# Patient Record
Sex: FEMALE | Race: BLACK OR AFRICAN AMERICAN | NOT HISPANIC OR LATINO | Employment: FULL TIME | URBAN - METROPOLITAN AREA
[De-identification: names, ages, dates, MRNs, and addresses within clinical notes are randomized per-mention and may not be internally consistent; named-entity substitution may affect disease eponyms.]

---

## 2018-05-29 ENCOUNTER — OFFICE VISIT (OUTPATIENT)
Dept: PREADMISSION TESTING | Facility: HOSPITAL | Age: 33
End: 2018-05-29
Attending: OBSTETRICS & GYNECOLOGY
Payer: COMMERCIAL

## 2018-05-29 VITALS
SYSTOLIC BLOOD PRESSURE: 112 MMHG | BODY MASS INDEX: 34.32 KG/M2 | HEIGHT: 65 IN | WEIGHT: 206 LBS | TEMPERATURE: 97.9 F | RESPIRATION RATE: 18 BRPM | DIASTOLIC BLOOD PRESSURE: 70 MMHG | HEART RATE: 72 BPM

## 2018-05-29 DIAGNOSIS — Z01.818 PREOP TESTING: Primary | ICD-10-CM

## 2018-05-29 PROBLEM — Z34.90 TERM PREGNANCY: Status: ACTIVE | Noted: 2018-05-29

## 2018-05-29 PROBLEM — Z98.891 HISTORY OF CESAREAN SECTION: Status: ACTIVE | Noted: 2018-05-29

## 2018-05-29 LAB
ABO + RH BLD: NORMAL
BLD GP AB SCN SERPL QL: NEGATIVE
D AG BLD QL: POSITIVE
ERYTHROCYTE [DISTWIDTH] IN BLOOD BY AUTOMATED COUNT: 14.1 % (ref 11.7–14.4)
HCT VFR BLDCO AUTO: 30.1 % (ref 35–45)
HGB BLD-MCNC: 10 G/DL (ref 11.8–15.7)
LABORATORY COMMENT REPORT: NORMAL
MCH RBC QN AUTO: 28.3 PG (ref 28–33.2)
MCHC RBC AUTO-ENTMCNC: 33.2 G/DL (ref 32.2–35.5)
MCV RBC AUTO: 85.3 FL (ref 83–98)
PDW BLD AUTO: 11 FL (ref 9.4–12.3)
PLATELET # BLD AUTO: 195 K/UL (ref 150–369)
RBC # BLD AUTO: 3.53 M/UL (ref 3.93–5.22)
WBC # BLD AUTO: 7.47 K/UL (ref 3.8–10.5)

## 2018-05-29 PROCEDURE — 36415 COLL VENOUS BLD VENIPUNCTURE: CPT

## 2018-05-29 PROCEDURE — 86850 RBC ANTIBODY SCREEN: CPT

## 2018-05-29 PROCEDURE — 85027 COMPLETE CBC AUTOMATED: CPT

## 2018-05-29 RX ORDER — FERROUS SULFATE 325(65) MG
65 TABLET ORAL 2 TIMES DAILY WITH MEALS
COMMUNITY

## 2018-05-29 ASSESSMENT — ENCOUNTER SYMPTOMS
CHILLS: 0
SHORTNESS OF BREATH: 0
ROS GI COMMENTS: HEARTBURN WITH PREGNANCY
ALLERGIC/IMMUNOLOGIC NEGATIVE: 1
VOMITING: 0
PALPITATIONS: 0
MUSCULOSKELETAL NEGATIVE: 1
ABDOMINAL PAIN: 0
APNEA: 0
NUMBNESS: 0
DYSURIA: 0
NAUSEA: 1
ENDOCRINE NEGATIVE: 1
WEAKNESS: 0
FEVER: 0
WOUND: 0
HEMATURIA: 0
CHEST TIGHTNESS: 0

## 2018-05-29 ASSESSMENT — PAIN SCALES - GENERAL: PAINLEVEL: 0-NO PAIN

## 2018-05-29 NOTE — H&P
Chief complaint: term pregnancy  HPI: 33 yo female  Ab1 with term pregnancy, estimated due date for 18, denies complications with current pregnancy, she had a urgent   after failed induction  Allergies:   Allergies   Allergen Reactions   • No Known Allergies      Patient's Meds:   Current Outpatient Prescriptions:   •  ferrous sulfate 325 mg (65 mg iron) tablet, Take 65 mg by mouth 2 (two) times a day with meals., Disp: , Rfl:   •  prenatal vits96/iron fum/folic (PRE- VITAMIN) 27 mg iron- 800 mcg tablet, Take 1 tablet by mouth daily., Disp: , Rfl:   Medical History:   Past Medical History:   Diagnosis Date   • Anemia    • GERD (gastroesophageal reflux disease)     during pregnancy   • Wears glasses      Surgical History:   Past Surgical History:   Procedure Laterality Date   •  SECTION       Social History:   Social History     Social History   • Marital status:      Spouse name: N/A   • Number of children: N/A   • Years of education: N/A     Social History Main Topics   • Smoking status: Never Smoker   • Smokeless tobacco: Never Used   • Alcohol use No   • Drug use: No   • Sexual activity: Not Asked     Other Topics Concern   • None     Social History Narrative   • None     Family History:   Family History   Problem Relation Age of Onset   • No Known Problems Mother    • Diabetes Father    • Hyperlipidemia Father      Review of Systems   Constitutional: Negative for chills and fever.   HENT: Negative for dental problem and hearing loss.    Eyes: Negative for visual disturbance.        Glasses   Respiratory: Negative for apnea, chest tightness and shortness of breath.    Cardiovascular: Negative for chest pain, palpitations and leg swelling.   Gastrointestinal: Positive for nausea (1st and 2nd trimester- improved). Negative for abdominal pain and vomiting.        Heartburn with pregnancy   Endocrine: Negative.    Genitourinary: Negative for dysuria and hematuria.    Musculoskeletal: Negative.    Skin: Negative for wound.   Allergic/Immunologic: Negative.    Neurological: Negative for weakness and numbness.   Hematological:        Anemia     Vitals:   Vitals:    18 0834   BP: 112/70   Pulse: 72   Resp: 18   Temp: 36.6 °C (97.9 °F)     Body mass index is 34.28 kg/m².  Physical Exam   Constitutional: She is oriented to person, place, and time. She appears well-developed and well-nourished.   HENT:   Head: Normocephalic.   Mouth/Throat: Oropharynx is clear and moist.   Eyes: Conjunctivae are normal. Pupils are equal, round, and reactive to light.   Neck: Normal range of motion. Neck supple. No thyromegaly present.   Cardiovascular: Normal rate, regular rhythm, normal heart sounds and intact distal pulses.    No murmur heard.  Pulmonary/Chest: Effort normal and breath sounds normal. No respiratory distress.   Abdominal: Soft. Bowel sounds are normal. She exhibits no mass. There is no tenderness.   + fetal heart sounds 136 BPM   Genitourinary:   Genitourinary Comments: Deferred to OB GYN   Musculoskeletal: Normal range of motion.   Lymphadenopathy:     She has no cervical adenopathy.   Neurological: She is alert and oriented to person, place, and time.   Skin: Skin is warm and dry.   Psychiatric: She has a normal mood and affect. Her behavior is normal. Thought content normal.   Vitals reviewed.    Labs pending  Patient Active Problem List   Diagnosis   • Term pregnancy   • History of  section       Assessment/Plan:  Repeat

## 2018-06-15 ENCOUNTER — ANESTHESIA EVENT (INPATIENT)
Dept: OBSTETRICS AND GYNECOLOGY | Facility: HOSPITAL | Age: 33
End: 2018-06-15
Payer: COMMERCIAL

## 2018-06-15 ENCOUNTER — ANESTHESIA (INPATIENT)
Dept: OBSTETRICS AND GYNECOLOGY | Facility: HOSPITAL | Age: 33
End: 2018-06-15
Payer: COMMERCIAL

## 2018-06-15 ENCOUNTER — HOSPITAL ENCOUNTER (INPATIENT)
Facility: HOSPITAL | Age: 33
LOS: 3 days | Discharge: HOME | End: 2018-06-18
Attending: OBSTETRICS & GYNECOLOGY | Admitting: OBSTETRICS & GYNECOLOGY
Payer: COMMERCIAL

## 2018-06-15 DIAGNOSIS — Z98.891 HISTORY OF CESAREAN SECTION: ICD-10-CM

## 2018-06-15 DIAGNOSIS — Z34.90 TERM PREGNANCY: Primary | ICD-10-CM

## 2018-06-15 LAB
ABO + RH BLD: NORMAL
BASOPHILS # BLD: 0.02 K/UL (ref 0.01–0.1)
BASOPHILS NFR BLD: 0.3 %
BLD GP AB SCN SERPL QL: NEGATIVE
D AG BLD QL: POSITIVE
DIFFERENTIAL METHOD BLD: ABNORMAL
EOSINOPHIL # BLD: 0.01 K/UL (ref 0.04–0.36)
EOSINOPHIL NFR BLD: 0.2 %
ERYTHROCYTE [DISTWIDTH] IN BLOOD BY AUTOMATED COUNT: 14.5 % (ref 11.7–14.4)
HCT VFR BLDCO AUTO: 29.7 % (ref 35–45)
HGB BLD-MCNC: 9.7 G/DL (ref 11.8–15.7)
IMM GRANULOCYTES # BLD AUTO: 0.07 K/UL (ref 0–0.08)
IMM GRANULOCYTES NFR BLD AUTO: 1.1 %
LABORATORY COMMENT REPORT: NORMAL
LYMPHOCYTES # BLD: 1.49 K/UL (ref 1.2–3.5)
LYMPHOCYTES NFR BLD: 22.6 %
MCH RBC QN AUTO: 27.7 PG (ref 28–33.2)
MCHC RBC AUTO-ENTMCNC: 32.7 G/DL (ref 32.2–35.5)
MCV RBC AUTO: 84.9 FL (ref 83–98)
MONOCYTES # BLD: 0.58 K/UL (ref 0.28–0.8)
MONOCYTES NFR BLD: 8.8 %
NEUTROPHILS # BLD: 4.41 K/UL (ref 1.7–7)
NEUTS SEG NFR BLD: 67 %
NRBC BLD-RTO: 0 %
PDW BLD AUTO: 11.4 FL (ref 9.4–12.3)
PLATELET # BLD AUTO: 179 K/UL (ref 150–369)
RBC # BLD AUTO: 3.5 M/UL (ref 3.93–5.22)
WBC # BLD AUTO: 6.58 K/UL (ref 3.8–10.5)

## 2018-06-15 PROCEDURE — 37000010 ANESTHESIA SPINAL BLOCK: Performed by: ANESTHESIOLOGY

## 2018-06-15 PROCEDURE — 71000011 HC PACU PHASE 1 EA ADDL MIN: Performed by: OBSTETRICS & GYNECOLOGY

## 2018-06-15 PROCEDURE — 63600000 HC DRUGS/DETAIL CODE: Performed by: OBSTETRICS & GYNECOLOGY

## 2018-06-15 PROCEDURE — 25000000 HC PHARMACY GENERAL: Performed by: NURSE ANESTHETIST, CERTIFIED REGISTERED

## 2018-06-15 PROCEDURE — 63700000 HC SELF-ADMINISTRABLE DRUG: Performed by: OBSTETRICS & GYNECOLOGY

## 2018-06-15 PROCEDURE — 12000000 HC ROOM AND CARE MED/SURG

## 2018-06-15 PROCEDURE — 63600000 HC DRUGS/DETAIL CODE: Performed by: ANESTHESIOLOGY

## 2018-06-15 PROCEDURE — 86900 BLOOD TYPING SEROLOGIC ABO: CPT

## 2018-06-15 PROCEDURE — 63600000 HC DRUGS/DETAIL CODE: Performed by: NURSE ANESTHETIST, CERTIFIED REGISTERED

## 2018-06-15 PROCEDURE — 86592 SYPHILIS TEST NON-TREP QUAL: CPT | Performed by: OBSTETRICS & GYNECOLOGY

## 2018-06-15 PROCEDURE — 85025 COMPLETE CBC W/AUTO DIFF WBC: CPT | Performed by: OBSTETRICS & GYNECOLOGY

## 2018-06-15 PROCEDURE — 71000001 HC PACU PHASE 1 INITIAL 30MIN: Performed by: OBSTETRICS & GYNECOLOGY

## 2018-06-15 PROCEDURE — 37000010 HC ANESTHESIA SPINAL: Performed by: OBSTETRICS & GYNECOLOGY

## 2018-06-15 PROCEDURE — 72000021 HC C SECTION LEVEL 1: Performed by: OBSTETRICS & GYNECOLOGY

## 2018-06-15 PROCEDURE — 36415 COLL VENOUS BLD VENIPUNCTURE: CPT | Performed by: OBSTETRICS & GYNECOLOGY

## 2018-06-15 RX ORDER — PHENYLEPHRINE HYDROCHLORIDE 10 MG/ML
INJECTION INTRAVENOUS AS NEEDED
Status: DISCONTINUED | OUTPATIENT
Start: 2018-06-15 | End: 2018-06-15 | Stop reason: SURG

## 2018-06-15 RX ORDER — OXYCODONE AND ACETAMINOPHEN 5; 325 MG/1; MG/1
1-2 TABLET ORAL EVERY 4 HOURS PRN
Status: DISCONTINUED | OUTPATIENT
Start: 2018-06-15 | End: 2018-06-18 | Stop reason: HOSPADM

## 2018-06-15 RX ORDER — SODIUM CHLORIDE, SODIUM LACTATE, POTASSIUM CHLORIDE, CALCIUM CHLORIDE 600; 310; 30; 20 MG/100ML; MG/100ML; MG/100ML; MG/100ML
INJECTION, SOLUTION INTRAVENOUS CONTINUOUS
Status: DISCONTINUED | OUTPATIENT
Start: 2018-06-15 | End: 2018-06-18 | Stop reason: HOSPADM

## 2018-06-15 RX ORDER — DIPHENHYDRAMINE HYDROCHLORIDE 50 MG/ML
12.5 INJECTION INTRAMUSCULAR; INTRAVENOUS EVERY 6 HOURS PRN
Status: DISCONTINUED | OUTPATIENT
Start: 2018-06-15 | End: 2018-06-18 | Stop reason: HOSPADM

## 2018-06-15 RX ORDER — MORPHINE SULFATE 0.5 MG/ML
INJECTION, SOLUTION EPIDURAL; INTRATHECAL; INTRAVENOUS AS NEEDED
Status: DISCONTINUED | OUTPATIENT
Start: 2018-06-15 | End: 2018-06-15 | Stop reason: SURG

## 2018-06-15 RX ORDER — FERROUS SULFATE 325(65) MG
325 TABLET ORAL 2 TIMES DAILY WITH MEALS
Status: DISCONTINUED | OUTPATIENT
Start: 2018-06-15 | End: 2018-06-18 | Stop reason: HOSPADM

## 2018-06-15 RX ORDER — DIPHENHYDRAMINE HCL 25 MG
25 CAPSULE ORAL EVERY 6 HOURS PRN
Status: DISCONTINUED | OUTPATIENT
Start: 2018-06-15 | End: 2018-06-18 | Stop reason: HOSPADM

## 2018-06-15 RX ORDER — OXYTOCIN/RINGER'S LACTATE 20/1000 ML
125 PLASTIC BAG, INJECTION (ML) INTRAVENOUS CONTINUOUS
Status: ACTIVE | OUTPATIENT
Start: 2018-06-15 | End: 2018-06-15

## 2018-06-15 RX ORDER — AMOXICILLIN 250 MG
1 CAPSULE ORAL 2 TIMES DAILY
Status: DISCONTINUED | OUTPATIENT
Start: 2018-06-15 | End: 2018-06-18 | Stop reason: HOSPADM

## 2018-06-15 RX ORDER — LANOLIN
1 WAX (GRAM) MISCELLANEOUS AS NEEDED
Status: DISCONTINUED | OUTPATIENT
Start: 2018-06-15 | End: 2018-06-18 | Stop reason: HOSPADM

## 2018-06-15 RX ORDER — IBUPROFEN 600 MG/1
600 TABLET ORAL EVERY 6 HOURS PRN
Status: DISCONTINUED | OUTPATIENT
Start: 2018-06-17 | End: 2018-06-18 | Stop reason: HOSPADM

## 2018-06-15 RX ORDER — KETOROLAC TROMETHAMINE 30 MG/ML
INJECTION, SOLUTION INTRAMUSCULAR; INTRAVENOUS AS NEEDED
Status: DISCONTINUED | OUTPATIENT
Start: 2018-06-15 | End: 2018-06-15 | Stop reason: SURG

## 2018-06-15 RX ORDER — ONDANSETRON HYDROCHLORIDE 2 MG/ML
INJECTION, SOLUTION INTRAVENOUS AS NEEDED
Status: DISCONTINUED | OUTPATIENT
Start: 2018-06-15 | End: 2018-06-15 | Stop reason: SURG

## 2018-06-15 RX ORDER — NALOXONE HYDROCHLORIDE 0.4 MG/ML
0.4 INJECTION, SOLUTION INTRAMUSCULAR; INTRAVENOUS; SUBCUTANEOUS AS NEEDED
Status: DISCONTINUED | OUTPATIENT
Start: 2018-06-15 | End: 2018-06-18 | Stop reason: HOSPADM

## 2018-06-15 RX ORDER — DIBUCAINE 1 %
1 OINTMENT (GRAM) TOPICAL AS NEEDED
Status: DISCONTINUED | OUTPATIENT
Start: 2018-06-15 | End: 2018-06-18 | Stop reason: HOSPADM

## 2018-06-15 RX ORDER — PROCHLORPERAZINE EDISYLATE 5 MG/ML
10 INJECTION INTRAMUSCULAR; INTRAVENOUS EVERY 6 HOURS PRN
Status: DISCONTINUED | OUTPATIENT
Start: 2018-06-15 | End: 2018-06-18 | Stop reason: HOSPADM

## 2018-06-15 RX ORDER — ONDANSETRON HYDROCHLORIDE 2 MG/ML
4 INJECTION, SOLUTION INTRAVENOUS EVERY 6 HOURS PRN
Status: DISCONTINUED | OUTPATIENT
Start: 2018-09-13 | End: 2018-06-18 | Stop reason: HOSPADM

## 2018-06-15 RX ORDER — OXYTOCIN/RINGER'S LACTATE 20/1000 ML
PLASTIC BAG, INJECTION (ML) INTRAVENOUS AS NEEDED
Status: DISCONTINUED | OUTPATIENT
Start: 2018-06-15 | End: 2018-06-15 | Stop reason: SURG

## 2018-06-15 RX ORDER — HYDROMORPHONE HYDROCHLORIDE 1 MG/ML
0.5 INJECTION, SOLUTION INTRAMUSCULAR; INTRAVENOUS; SUBCUTANEOUS
Status: DISCONTINUED | OUTPATIENT
Start: 2018-06-15 | End: 2018-06-18 | Stop reason: HOSPADM

## 2018-06-15 RX ORDER — KETOROLAC TROMETHAMINE 15 MG/ML
15 INJECTION, SOLUTION INTRAMUSCULAR; INTRAVENOUS
Status: DISPENSED | OUTPATIENT
Start: 2018-06-15 | End: 2018-06-17

## 2018-06-15 RX ORDER — SODIUM CHLORIDE, SODIUM LACTATE, POTASSIUM CHLORIDE, CALCIUM CHLORIDE 600; 310; 30; 20 MG/100ML; MG/100ML; MG/100ML; MG/100ML
125 INJECTION, SOLUTION INTRAVENOUS CONTINUOUS
Status: ACTIVE | OUTPATIENT
Start: 2018-06-15 | End: 2018-06-16

## 2018-06-15 RX ORDER — ONDANSETRON 4 MG/1
4 TABLET, ORALLY DISINTEGRATING ORAL EVERY 6 HOURS PRN
Status: DISCONTINUED | OUTPATIENT
Start: 2018-06-15 | End: 2018-06-18 | Stop reason: HOSPADM

## 2018-06-15 RX ORDER — FENTANYL CITRATE 50 UG/ML
INJECTION, SOLUTION INTRAMUSCULAR; INTRAVENOUS AS NEEDED
Status: DISCONTINUED | OUTPATIENT
Start: 2018-06-15 | End: 2018-06-15 | Stop reason: SURG

## 2018-06-15 RX ORDER — ONDANSETRON HYDROCHLORIDE 2 MG/ML
4 INJECTION, SOLUTION INTRAVENOUS
Status: DISCONTINUED | OUTPATIENT
Start: 2018-06-15 | End: 2018-06-18 | Stop reason: HOSPADM

## 2018-06-15 RX ORDER — EPHEDRINE SULFATE 50 MG/ML
INJECTION, SOLUTION INTRAVENOUS AS NEEDED
Status: DISCONTINUED | OUTPATIENT
Start: 2018-06-15 | End: 2018-06-15 | Stop reason: SURG

## 2018-06-15 RX ORDER — FENTANYL CITRATE 50 UG/ML
50 INJECTION, SOLUTION INTRAMUSCULAR; INTRAVENOUS
Status: DISCONTINUED | OUTPATIENT
Start: 2018-06-15 | End: 2018-06-18 | Stop reason: HOSPADM

## 2018-06-15 RX ORDER — SODIUM CITRATE AND CITRIC ACID MONOHYDRATE 334; 500 MG/5ML; MG/5ML
30 SOLUTION ORAL ONCE
Status: COMPLETED | OUTPATIENT
Start: 2018-06-15 | End: 2018-06-15

## 2018-06-15 RX ORDER — BISACODYL 10 MG/1
10 SUPPOSITORY RECTAL DAILY PRN
Status: DISCONTINUED | OUTPATIENT
Start: 2018-06-15 | End: 2018-06-18 | Stop reason: HOSPADM

## 2018-06-15 RX ORDER — CEFAZOLIN SODIUM/WATER 2 G/20 ML
2 SYRINGE (ML) INTRAVENOUS
Status: COMPLETED | OUTPATIENT
Start: 2018-06-15 | End: 2018-06-15

## 2018-06-15 RX ORDER — CALCIUM CARBONATE 200(500)MG
500 TABLET,CHEWABLE ORAL EVERY 4 HOURS PRN
Status: DISCONTINUED | OUTPATIENT
Start: 2018-06-15 | End: 2018-06-18 | Stop reason: HOSPADM

## 2018-06-15 RX ORDER — SODIUM CHLORIDE, SODIUM LACTATE, POTASSIUM CHLORIDE, CALCIUM CHLORIDE 600; 310; 30; 20 MG/100ML; MG/100ML; MG/100ML; MG/100ML
INJECTION, SOLUTION INTRAVENOUS CONTINUOUS
Status: ACTIVE | OUTPATIENT
Start: 2018-06-15 | End: 2018-06-15

## 2018-06-15 RX ORDER — METOCLOPRAMIDE HYDROCHLORIDE 5 MG/ML
10 INJECTION INTRAMUSCULAR; INTRAVENOUS
Status: DISCONTINUED | OUTPATIENT
Start: 2018-06-15 | End: 2018-06-18 | Stop reason: HOSPADM

## 2018-06-15 RX ORDER — ALUMINUM HYDROXIDE, MAGNESIUM HYDROXIDE, AND SIMETHICONE 1200; 120; 1200 MG/30ML; MG/30ML; MG/30ML
30 SUSPENSION ORAL EVERY 4 HOURS PRN
Status: DISCONTINUED | OUTPATIENT
Start: 2018-06-15 | End: 2018-06-18 | Stop reason: HOSPADM

## 2018-06-15 RX ORDER — ACETAMINOPHEN 325 MG/1
650 TABLET ORAL EVERY 4 HOURS PRN
Status: DISCONTINUED | OUTPATIENT
Start: 2018-06-15 | End: 2018-06-18 | Stop reason: HOSPADM

## 2018-06-15 RX ADMIN — PHENYLEPHRINE HYDROCHLORIDE 100 MCG: 10 INJECTION INTRAVENOUS at 10:46

## 2018-06-15 RX ADMIN — Medication 249 ML: at 11:25

## 2018-06-15 RX ADMIN — FENTANYL CITRATE 50 MCG: 50 INJECTION, SOLUTION INTRAMUSCULAR; INTRAVENOUS at 10:45

## 2018-06-15 RX ADMIN — EPHEDRINE SULFATE 10 MG: 50 INJECTION, SOLUTION INTRAVENOUS at 10:21

## 2018-06-15 RX ADMIN — Medication 250 ML: at 10:47

## 2018-06-15 RX ADMIN — ONDANSETRON 4 MG: 2 INJECTION INTRAMUSCULAR; INTRAVENOUS at 10:38

## 2018-06-15 RX ADMIN — SODIUM CHLORIDE, SODIUM LACTATE, POTASSIUM CHLORIDE, CALCIUM CHLORIDE: 600; 310; 30; 20 INJECTION, SOLUTION INTRAVENOUS at 09:47

## 2018-06-15 RX ADMIN — Medication 2 G: at 10:05

## 2018-06-15 RX ADMIN — KETOROLAC TROMETHAMINE 15 MG: 15 INJECTION, SOLUTION INTRAMUSCULAR; INTRAVENOUS at 18:00

## 2018-06-15 RX ADMIN — SODIUM CHLORIDE, SODIUM LACTATE, POTASSIUM CHLORIDE, CALCIUM CHLORIDE: 600; 310; 30; 20 INJECTION, SOLUTION INTRAVENOUS at 09:36

## 2018-06-15 RX ADMIN — FENTANYL CITRATE 50 MCG: 50 INJECTION, SOLUTION INTRAMUSCULAR; INTRAVENOUS at 10:54

## 2018-06-15 RX ADMIN — DIPHENHYDRAMINE HYDROCHLORIDE 12.5 MG: 50 INJECTION, SOLUTION INTRAMUSCULAR; INTRAVENOUS at 14:02

## 2018-06-15 RX ADMIN — Medication 250 ML: at 11:00

## 2018-06-15 RX ADMIN — KETOROLAC TROMETHAMINE 15 MG: 15 INJECTION, SOLUTION INTRAMUSCULAR; INTRAVENOUS at 23:58

## 2018-06-15 RX ADMIN — DIPHENHYDRAMINE HYDROCHLORIDE 12.5 MG: 50 INJECTION, SOLUTION INTRAMUSCULAR; INTRAVENOUS at 20:08

## 2018-06-15 RX ADMIN — OXYCODONE HYDROCHLORIDE AND ACETAMINOPHEN 1 TABLET: 5; 325 TABLET ORAL at 23:58

## 2018-06-15 RX ADMIN — OXYCODONE HYDROCHLORIDE AND ACETAMINOPHEN 1 TABLET: 5; 325 TABLET ORAL at 19:32

## 2018-06-15 RX ADMIN — PHENYLEPHRINE HYDROCHLORIDE 100 MCG: 10 INJECTION INTRAVENOUS at 10:19

## 2018-06-15 RX ADMIN — Medication 1 ML: at 10:37

## 2018-06-15 RX ADMIN — PHENYLEPHRINE HYDROCHLORIDE 50 MCG: 10 INJECTION INTRAVENOUS at 10:42

## 2018-06-15 RX ADMIN — DIPHENHYDRAMINE HYDROCHLORIDE 12.5 MG: 50 INJECTION, SOLUTION INTRAMUSCULAR; INTRAVENOUS at 23:58

## 2018-06-15 RX ADMIN — HYDROMORPHONE HYDROCHLORIDE 0.5 MG: 1 INJECTION, SOLUTION INTRAMUSCULAR; INTRAVENOUS; SUBCUTANEOUS at 14:27

## 2018-06-15 RX ADMIN — SENNOSIDES AND DOCUSATE SODIUM 1 TABLET: 8.6; 5 TABLET ORAL at 19:32

## 2018-06-15 RX ADMIN — Medication 250 ML: at 11:16

## 2018-06-15 RX ADMIN — MORPHINE SULFATE 0.15 MG: 0.5 INJECTION, SOLUTION EPIDURAL; INTRATHECAL; INTRAVENOUS at 10:10

## 2018-06-15 RX ADMIN — SODIUM CHLORIDE, SODIUM LACTATE, POTASSIUM CHLORIDE, CALCIUM CHLORIDE: 600; 310; 30; 20 INJECTION, SOLUTION INTRAVENOUS at 08:00

## 2018-06-15 RX ADMIN — SODIUM CHLORIDE, SODIUM LACTATE, POTASSIUM CHLORIDE, CALCIUM CHLORIDE: 600; 310; 30; 20 INJECTION, SOLUTION INTRAVENOUS at 18:57

## 2018-06-15 RX ADMIN — FERROUS SULFATE TAB 325 MG (65 MG ELEMENTAL FE) 325 MG: 325 (65 FE) TAB at 19:32

## 2018-06-15 RX ADMIN — PHENYLEPHRINE HYDROCHLORIDE 100 MCG: 10 INJECTION INTRAVENOUS at 10:21

## 2018-06-15 RX ADMIN — PHENYLEPHRINE HYDROCHLORIDE 100 MCG: 10 INJECTION INTRAVENOUS at 10:10

## 2018-06-15 RX ADMIN — KETOROLAC TROMETHAMINE 30 MG: 30 INJECTION, SOLUTION INTRAMUSCULAR at 11:16

## 2018-06-15 RX ADMIN — SODIUM CITRATE AND CITRIC ACID MONOHYDRATE 30 ML: 500; 334 SOLUTION ORAL at 09:32

## 2018-06-15 NOTE — ANESTHESIA POSTPROCEDURE EVALUATION
Patient: Joshua Garcia    Procedure Summary     Date:  06/15/18 Room / Location:   L&D 1 /  L&D OR    Anesthesia Start:  0952 Anesthesia Stop:  1140    Procedure:  C/Section (N/A ) Diagnosis:  (repeat c- section)    Surgeon:  Ted Blair MD Responsible Provider:  Scott Lanier MD    Anesthesia Type:  spinal ASA Status:  2 - Emergent          Anesthesia Type: spinal  PACU Vitals     No data found.            Anesthesia Post Evaluation    Pain management: adequate  Patient location during evaluation: PACU  Patient participation: complete - patient participated  Level of consciousness: awake and alert  Cardiovascular status: acceptable  Airway Patency: adequate  Respiratory status: acceptable  Hydration status: acceptable  Anesthetic complications: no

## 2018-06-15 NOTE — ANESTHESIA PREPROCEDURE EVALUATION
Anesthesia ROS/MED HX    Anesthesia History - neg  Pulmonary - neg  Neuro/Psych - neg  Cardiovascular- neg  GI/Hepatic   GERD    Control: well controlled  Musculoskeletal- neg  Endo/Other- neg   Body Habitus: Obese  ROS/MED HX Comments:    Endo: Anemia      Relevant Problems   No active problems are marked relevant to this note.       Physical Exam    Airway   Mallampati: III   TM distance: >3 FB   Neck ROM: full  Cardiovascular - normal   Rhythm: regular   Rate: normal  Pulmonary - normal   clear to auscultation  Dental - normal        Anesthesia Plan    Plan: spinal   ASA 2 - emergent  Anesthetic plan and risks discussed with: patient and significant other  Postop Plan:   Patient Disposition: inpatient floor planned admission   Pain Management: spinal

## 2018-06-15 NOTE — PRE-PROCEDURE NOTE
33yo P1  female presents for an elective repeat  section.  She had a previous  section for NRFHT at 41wks.  Procedure risks and benefits were reviewed with the pt.  Consent was signed in our office.      PNC has been with [facility]:  1. Anemia - Fe supps  2. Previous C/S    3. Enterococcus UTI 2017 - Rx'd.      PN Labs:    O+Ab-neg, RPR-NR , Rub-Imm, HBsAg-neg, HIV-neg, Uxtcmyr=170, Hgb=11->9.4, Krf=299->201K  GBS-negative    A/P: 39+wk IUP previous  section for elective rpt C/s.

## 2018-06-15 NOTE — OP NOTE
OB  Delivery OP Note    Date of Procedure: 2018  Patient:Joshua Garcia  :1985    Procedure:    C/Section  CPT(R) Code:  05998 - DE FULL ROUT OBSTE CARE, DELIV     Review the Delivery Report for details.      Details    Pre-Op Diagnosis: 1. 32 y.o.  with Intrauterine pregnancy at 39w6d  2. History of previous  section   Post-Op Diagnosis: 1. Same   Procedure: Repeat  , Low Transverse  via Low Transverse uterine incision and Pfannenstiel skin incision.   Anesthesia: Spinal  - Dr. Lanier   Findings: Normal uterus, tubes, and ovaries.   EBL  800 mL   Complications: none     Specimen Information:  ID Type Source Tests Collected by Time Destination   1 : intact Tissue Placenta PATHOLOGY TISSUE EXAM Ted Blair MD 6/15/2018 1243      Drains: Gutiérrez draining clear yellow urine    Staff:  Surgeon(s):  MD Rebel Shanks MD  Anesthesiologist: Scott Lanier MD  CRNA: Forest Lyons CRNA   Circulator: Susie Cooper RN  Scrub Person: Matheus Jorgensen RN  Baby Nurse: Rosita Jones RN  Other Staff:  REBEL GOULD  Delivery Assist    INFANT INFORMATION  Time of Birth:10:34 AM   Presentation: Vertex   Position:Right ,Occiput ,Posterior ,    Cord: 3 vessels ,Complications:Body Cord   Spurger Sex: male    Weight: 8lbs 15oz      1 Minute 5 Minute 10 Minute   Apgar Totals: 9    9            Information for the patient's :  Yg Garcia  [[id number]]      Cord Gas     None          Informed Consent:  An informed consent was obtained.    Indications: Scheduled elective repeat  section     Findings: Normal appearing term male infant.  Normal uterus, tubes and ovaries bilaterally.    Procedure:  The patient was taken to the OR after consent was confirmed.  A gutiérrez catheter was inserted using Betadine prep.  The abdomen was then prepped with Chloraprep and after several minutes of drying, was draped in the  usual sterile fashion in supine position with left lateral tilt.  A 'time-Out' was performed.      Once the level of anesthetic was found to be satisfactory with an Allis test, a Pfannenstiel skin incision was made with the scalpel through the previous Pfannenstiel scar, and carried through to the underlying layer of fascia with the knife and the Bovie electrocautery.  The fascia was incised in the midline and the incision was extended laterally with ramos scissors.  The rectus muscles were dissected off of the fascia superiorly and inferiorly with the Ramos scissors. The rectus muscles were  in the midline and the peritoneum was tented with two hemostats and entered with the Metzenbaum scissor.  The peritoneal incision was bluntly extended superiorly and inferiorly with excellent visualization of the bladder.  The vesicouterine peritoneum was identified and grasped with smooth pickups and incised sharply with Metzenbaum scissors, and a bladder flap was created using sharp and blunt dissection. The bladder blade was advanced.  The knife was used in the lower uterine segment, and carried down to the level of the amniotic sac.  Clear fluid was received.  The incision was extended bluntly in a transverse fashion.   All instruments were removed.      The infant's head was palpated, grasped and elevated to the incision.  With gentle fundal pressure the head was delivered.  There was a body cord x 1.  Shoulders and body then delivered without difficulty.  Cord was clamped and cut and infant was handed to Dr. Deluna, the neonaotlogist who was present for delivery.  Cord blood was collected.  Placenta was expressed, then manually delivered intact.  The uterus was exteriorized and cleared of all clots and debris with dry laps.  The hysterotomy was closed with 0 vicryl suture in a running interlocking fashion.  A second layer of the same suture type was utilized in an imbricating fashion to obtain excellent hemostasis.       The uterus was returned to the abdomen and the gutters were cleared of all clots and debris.  The hysterotomy was inspected and noted to be without active bleeding. The bladder flap was not reapproximated.  The parietal peritoneum was grasped with Nurys hemostats, and closed in a running fashion with 0 Chromic suture.  The fascia was reapproximated using 0 Vicryl suture in a running fashion from angle to midline bilaterally.  Small bleeders in the subcutaneous tissue were coagulated with the Bovie.  The subcutaneous tissue was then reapproximated using 2/0 plain suture.  The skin was reapproximated using 4/0 Monocryl in a subcuticular fashion.  Dermabond skin glue was applied to the incision site and after drying a Telfa, ABD and Tegaderm were applied.    Infant was stable and went to the recovery room.      The patient tolerated the procedure well. The sponge, instrument, and needle counts were correct and the patient was taken to recovery in stable condition.    Attending Attestation: I was present and scrubbed for the entire procedure.    Ted Blair MD

## 2018-06-15 NOTE — OR SURGEON
Pre-Procedure patient identification:  I am the primary operating surgeon/proceduralist and I have identified the patient on 06/15/18 at 10:10 AM Ted Blair MD  Phone Number: [phone]

## 2018-06-15 NOTE — ANESTHESIA PROCEDURE NOTES
Spinal Block    Patient location during procedure: OB  Start time: 6/15/2018 10:15 AM  End time: 6/15/2018 10:20 AM  Staffing  Anesthesiologist: LAURA BAIRES  Performed: anesthesiologist   Reason for block: primary anesthetic  Preanesthetic Checklist  Completed: patient identified, site marked, surgical consent, pre-op evaluation, timeout performed, IV checked, risks and benefits discussed, monitors and equipment checked and sterile field maintained during procedure  Spinal Block  Patient position: sitting  Prep: ChloraPrep  Patient monitoring: heart rate, cardiac monitor, continuous pulse ox and blood pressure  Approach: midline  Location: L3-4  Injection technique: single-shot  Needle  Needle type: Maria Ines   Needle gauge: 25 G  Assessment  Sensory level: T4  Additional Notes  Bujpivacaine 12mg/Duramorph 0.15mg via spinal

## 2018-06-16 LAB
ERYTHROCYTE [DISTWIDTH] IN BLOOD BY AUTOMATED COUNT: 14.3 % (ref 11.7–14.4)
HCT VFR BLDCO AUTO: 25.8 % (ref 35–45)
HGB BLD-MCNC: 8.4 G/DL (ref 11.8–15.7)
MCH RBC QN AUTO: 27.8 PG (ref 28–33.2)
MCHC RBC AUTO-ENTMCNC: 32.6 G/DL (ref 32.2–35.5)
MCV RBC AUTO: 85.4 FL (ref 83–98)
PDW BLD AUTO: 11.7 FL (ref 9.4–12.3)
PLATELET # BLD AUTO: 156 K/UL (ref 150–369)
RBC # BLD AUTO: 3.02 M/UL (ref 3.93–5.22)
WBC # BLD AUTO: 7.91 K/UL (ref 3.8–10.5)

## 2018-06-16 PROCEDURE — 12000000 HC ROOM AND CARE MED/SURG

## 2018-06-16 PROCEDURE — 36415 COLL VENOUS BLD VENIPUNCTURE: CPT | Performed by: OBSTETRICS & GYNECOLOGY

## 2018-06-16 PROCEDURE — 63600000 HC DRUGS/DETAIL CODE: Performed by: OBSTETRICS & GYNECOLOGY

## 2018-06-16 PROCEDURE — 85027 COMPLETE CBC AUTOMATED: CPT | Performed by: OBSTETRICS & GYNECOLOGY

## 2018-06-16 PROCEDURE — 63700000 HC SELF-ADMINISTRABLE DRUG: Performed by: OBSTETRICS & GYNECOLOGY

## 2018-06-16 RX ORDER — FERROUS SULFATE 325(65) MG
325 TABLET ORAL 2 TIMES DAILY WITH MEALS
Status: DISCONTINUED | OUTPATIENT
Start: 2018-06-16 | End: 2018-06-17

## 2018-06-16 RX ADMIN — PRENATAL VIT W/ FE FUMARATE-FA TAB 27-0.8 MG 1 TABLET: 27-0.8 TAB at 10:04

## 2018-06-16 RX ADMIN — DIPHENHYDRAMINE HYDROCHLORIDE 25 MG: 25 CAPSULE ORAL at 06:33

## 2018-06-16 RX ADMIN — OXYCODONE HYDROCHLORIDE AND ACETAMINOPHEN 2 TABLET: 5; 325 TABLET ORAL at 10:03

## 2018-06-16 RX ADMIN — OXYCODONE HYDROCHLORIDE AND ACETAMINOPHEN 1 TABLET: 5; 325 TABLET ORAL at 05:23

## 2018-06-16 RX ADMIN — KETOROLAC TROMETHAMINE 15 MG: 15 INJECTION, SOLUTION INTRAMUSCULAR; INTRAVENOUS at 12:27

## 2018-06-16 RX ADMIN — SODIUM CHLORIDE, SODIUM LACTATE, POTASSIUM CHLORIDE, CALCIUM CHLORIDE: 600; 310; 30; 20 INJECTION, SOLUTION INTRAVENOUS at 02:50

## 2018-06-16 RX ADMIN — SENNOSIDES AND DOCUSATE SODIUM 1 TABLET: 8.6; 5 TABLET ORAL at 20:12

## 2018-06-16 RX ADMIN — KETOROLAC TROMETHAMINE 15 MG: 15 INJECTION, SOLUTION INTRAMUSCULAR; INTRAVENOUS at 05:26

## 2018-06-16 RX ADMIN — KETOROLAC TROMETHAMINE 15 MG: 15 INJECTION, SOLUTION INTRAMUSCULAR; INTRAVENOUS at 18:26

## 2018-06-16 RX ADMIN — FERROUS SULFATE TAB 325 MG (65 MG ELEMENTAL FE) 325 MG: 325 (65 FE) TAB at 17:50

## 2018-06-16 RX ADMIN — SENNOSIDES AND DOCUSATE SODIUM 1 TABLET: 8.6; 5 TABLET ORAL at 10:04

## 2018-06-16 RX ADMIN — OXYCODONE HYDROCHLORIDE AND ACETAMINOPHEN 2 TABLET: 5; 325 TABLET ORAL at 17:49

## 2018-06-16 RX ADMIN — DIPHENHYDRAMINE HYDROCHLORIDE 25 MG: 25 CAPSULE ORAL at 12:27

## 2018-06-16 RX ADMIN — OXYCODONE HYDROCHLORIDE AND ACETAMINOPHEN 2 TABLET: 5; 325 TABLET ORAL at 23:31

## 2018-06-16 NOTE — PROGRESS NOTES
Obstetrics Postpartum Progress Note    Events  No acute events overnight.  No N/V/CP/SOB  Tolerating regular diet w/o N/V    Subjective  Pain: well controlled with current meds  Bleeding: lochia minimal  Diet: taking regular diet  Voiding: without difficulty  Ambulating: as tolerated  Feeding: plans to breastfeed    Vitals  Temp:  [36.4 °C (97.6 °F)-36.9 °C (98.4 °F)] 36.6 °C (97.9 °F)  Heart Rate:  [62-80] 80  Resp:  [18-20] 18  BP: (102-117)/(50-64) 117/64      Physical Exam  General: well  Heart: Regular rate and rhythm  Lungs: Clear to auscultation bilaterally  Abdomen: soft, distended, nontender, positive bowel sounds  Fundus: firm, at umbilicus and nontender  Incision: Dressing removed.                 C/D/I with subQ sutures, and skin sealed with Dermabond glue  Extremities: no calf tenderness or edema    Labs  Labs Reviewed:  Lab Results   Component Value Date    ABO O 06/15/2018    LABRH Positive 06/15/2018      Rubella: immune    Lab Results   Component Value Date    HGB 8.4 (L) 2018         Assessment/Plan   Problem-based Assessment and Plan    Joshua Garcia is a 32 y.o.  postop day 1 s/p elective repeat , Low Transverse .      1. Routine postop/postpartum care  2. Male infant, breast feeding, requests circumcision, for POD#2  3. Vital Signs: stable  4. Hemodynamics: anemia without signs of hemodynamic instability; will begin Fe supps  5. Pain: controlled  6. VTE Assessment: Early Ambulation  7. Post care: meeting all goals   8.  Anticipate discharge home on POD#3      Ted Blair MD

## 2018-06-16 NOTE — PLAN OF CARE
Problem: Postpartum ( Delivery) (Adult,Obstetrics,Pediatric)  Goal: Signs and Symptoms of Listed Potential Problems Will be Absent, Minimized or Managed (Postpartum)   18 2298   Postpartum ( Delivery)   Problems Assessed (Postpartum ) all   Problems Present (Postpartum ) none

## 2018-06-16 NOTE — PLAN OF CARE
Problem: Postpartum ( Delivery) (Adult,Obstetrics,Pediatric)  Goal: Signs and Symptoms of Listed Potential Problems Will be Absent, Minimized or Managed (Postpartum)  Outcome: Ongoing (interventions implemented as appropriate)   18 1838   Postpartum ( Delivery)   Problems Assessed (Postpartum ) all   Problems Present (Postpartum ) none     Goal: Anesthesia/Sedation Recovery  Outcome: Outcome(s) Achieved Date Met: 18

## 2018-06-16 NOTE — NURSING NOTE
Pt given breast pump for right nipple, nipple very firm and larger than the left. Baby having a hard time latching on the right. Pt ebcouraged to lie on side with breastfeedibg as her breasts are extremely large and heavy. This seemed to help.

## 2018-06-17 PROCEDURE — 63700000 HC SELF-ADMINISTRABLE DRUG: Performed by: OBSTETRICS & GYNECOLOGY

## 2018-06-17 PROCEDURE — 12000000 HC ROOM AND CARE MED/SURG

## 2018-06-17 RX ADMIN — IBUPROFEN 600 MG: 600 TABLET ORAL at 22:45

## 2018-06-17 RX ADMIN — IBUPROFEN 600 MG: 600 TABLET ORAL at 02:30

## 2018-06-17 RX ADMIN — SENNOSIDES AND DOCUSATE SODIUM 1 TABLET: 8.6; 5 TABLET ORAL at 19:58

## 2018-06-17 RX ADMIN — OXYCODONE HYDROCHLORIDE AND ACETAMINOPHEN 1 TABLET: 5; 325 TABLET ORAL at 08:20

## 2018-06-17 RX ADMIN — IBUPROFEN 600 MG: 600 TABLET ORAL at 09:47

## 2018-06-17 RX ADMIN — OXYCODONE HYDROCHLORIDE AND ACETAMINOPHEN 2 TABLET: 5; 325 TABLET ORAL at 02:37

## 2018-06-17 RX ADMIN — IBUPROFEN 600 MG: 600 TABLET ORAL at 16:28

## 2018-06-17 RX ADMIN — OXYCODONE HYDROCHLORIDE AND ACETAMINOPHEN 1 TABLET: 5; 325 TABLET ORAL at 12:27

## 2018-06-17 RX ADMIN — SENNOSIDES AND DOCUSATE SODIUM 1 TABLET: 8.6; 5 TABLET ORAL at 08:07

## 2018-06-17 RX ADMIN — FERROUS SULFATE TAB 325 MG (65 MG ELEMENTAL FE) 325 MG: 325 (65 FE) TAB at 16:27

## 2018-06-17 RX ADMIN — FERROUS SULFATE TAB 325 MG (65 MG ELEMENTAL FE) 325 MG: 325 (65 FE) TAB at 08:08

## 2018-06-17 RX ADMIN — OXYCODONE HYDROCHLORIDE AND ACETAMINOPHEN 1 TABLET: 5; 325 TABLET ORAL at 22:44

## 2018-06-17 RX ADMIN — OXYCODONE HYDROCHLORIDE AND ACETAMINOPHEN 1 TABLET: 5; 325 TABLET ORAL at 16:27

## 2018-06-17 RX ADMIN — PRENATAL VIT W/ FE FUMARATE-FA TAB 27-0.8 MG 1 TABLET: 27-0.8 TAB at 08:08

## 2018-06-17 NOTE — PROGRESS NOTES
Obstetrics Postpartum Progress Note    Events  No acute events overnight.  No N/V/CP/SOB    Subjective  Pain: well controlled with current meds  Bleeding: lochia minimal  Diet: taking regular diet  Voiding: without difficulty  Ambulating: as tolerated  Feeding: breastfeeding    Vitals  Temp:  [36.7 °C (98 °F)-36.8 °C (98.2 °F)] 36.8 °C (98.2 °F)  Heart Rate:  [77-85] 81  Resp:  [16] 16  BP: (109-119)/(64-69) 119/68      Physical Exam  General: well  Heart: Regular rate and rhythm  Lungs: Clear to auscultation bilaterally  Abdomen: soft, slight gaseous distention, nontender, positive bowel sounds  Fundus: firm, at umbilicus and nontender  Incision: C/D/I with subQ sutures, and skin sealed with Dermabond glue  Extremities: no calf tenderness or edema    Labs  Labs Reviewed:  Lab Results   Component Value Date    ABO O 06/15/2018    LABRH Positive 06/15/2018      Rubella: immune    Lab Results   Component Value Date    HGB 8.4 (L) 2018         Assessment/Plan   Problem-based Assessment and Plan    Joshua Garcia is a 32 y.o.  postop day 2 s/p elective repeat , Low Transverse .      1. Routine postop/postpartum care  2. Male infant, breast feeding, requests circumcision, for today  3. Vital Signs: stable  4. Hemodynamics: anemia without signs of hemodynamic instability; continue Fe supps  5. Pain: controlled  6. VTE Assessment: Early Ambulation  7. Post care: meeting all goals   8.  Anticipate discharge home on POD#3      Ted Blair MD

## 2018-06-18 VITALS
HEIGHT: 64 IN | BODY MASS INDEX: 35.51 KG/M2 | DIASTOLIC BLOOD PRESSURE: 63 MMHG | RESPIRATION RATE: 18 BRPM | HEART RATE: 70 BPM | WEIGHT: 208 LBS | OXYGEN SATURATION: 99 % | SYSTOLIC BLOOD PRESSURE: 117 MMHG | TEMPERATURE: 98.3 F

## 2018-06-18 PROBLEM — Z34.90 TERM PREGNANCY: Status: ACTIVE | Noted: 2018-06-18

## 2018-06-18 LAB — RPR SER QL: NORMAL

## 2018-06-18 PROCEDURE — 63700000 HC SELF-ADMINISTRABLE DRUG: Performed by: OBSTETRICS & GYNECOLOGY

## 2018-06-18 RX ORDER — OXYCODONE AND ACETAMINOPHEN 5; 325 MG/1; MG/1
1 TABLET ORAL EVERY 6 HOURS PRN
Qty: 20 TABLET | Refills: 0 | Status: SHIPPED | OUTPATIENT
Start: 2018-06-18 | End: 2018-06-23

## 2018-06-18 RX ORDER — IBUPROFEN 600 MG/1
600 TABLET ORAL EVERY 6 HOURS PRN
Qty: 40 TABLET | Refills: 1 | OUTPATIENT
Start: 2018-06-18 | End: 2021-08-09 | Stop reason: HOSPADM

## 2018-06-18 RX ORDER — AMOXICILLIN 250 MG
1 CAPSULE ORAL 2 TIMES DAILY
Qty: 60 TABLET | Refills: 1 | OUTPATIENT
Start: 2018-06-18 | End: 2021-08-09 | Stop reason: HOSPADM

## 2018-06-18 RX ADMIN — IBUPROFEN 600 MG: 600 TABLET ORAL at 10:09

## 2018-06-18 RX ADMIN — FERROUS SULFATE TAB 325 MG (65 MG ELEMENTAL FE) 325 MG: 325 (65 FE) TAB at 08:29

## 2018-06-18 RX ADMIN — PRENATAL VIT W/ FE FUMARATE-FA TAB 27-0.8 MG 1 TABLET: 27-0.8 TAB at 08:29

## 2018-06-18 RX ADMIN — IBUPROFEN 600 MG: 600 TABLET ORAL at 04:43

## 2018-06-18 RX ADMIN — OXYCODONE HYDROCHLORIDE AND ACETAMINOPHEN 1 TABLET: 5; 325 TABLET ORAL at 03:04

## 2018-06-18 RX ADMIN — SENNOSIDES AND DOCUSATE SODIUM 1 TABLET: 8.6; 5 TABLET ORAL at 08:29

## 2018-06-18 RX ADMIN — OXYCODONE HYDROCHLORIDE AND ACETAMINOPHEN 1 TABLET: 5; 325 TABLET ORAL at 10:08

## 2018-06-18 NOTE — DISCHARGE SUMMARY
Inpatient Discharge Summary    BRIEF OVERVIEW  Admitting Provider: Ted Blair MD  Discharge Provider: Ted Blair MD  Primary Care Physician at Discharge: Lorne Fry [phone]     Admission Date: 6/15/2018     Discharge Date: 2018    Primary Discharge Diagnosis  No Principal Problem: There is no principal problem currently on the Problem List. Please update the Problem List and refresh.        Discharge Disposition  Home     Discharge Medications     Medication List      START taking these medications    ibuprofen 600 mg tablet  Commonly known as:  MOTRIN  Take 1 tablet (600 mg total) by mouth every 6 (six) hours as needed for mild pain.     oxyCODONE-acetaminophen 5-325 mg per tablet  Commonly known as:  PERCOCET  Take 1 tablet by mouth every 6 (six) hours as needed for moderate pain for up to 5 days.     sennosides-docusate sodium 8.6-50 mg  Commonly known as:  SENOKOT-S  Take 1 tablet by mouth 2 (two) times a day for 175 doses.        CONTINUE taking these medications    ferrous sulfate 325 mg (65 mg iron) tablet  Take 65 mg by mouth 2 (two) times a day with meals.     pre-roverto vitamin 27 mg iron- 800 mcg tablet  Take 1 tablet by mouth daily.            Active Issues Requiring Follow-up  Issue: Incision  Responsible Individual: patient  What is Needed: follow up appointment in 2 weeks  Follow-up Appointments Arranged: No     Outpatient Follow-Up  No future appointments.    Test Results Pending at Discharge   Order Current Status    RPR In process          DETAILS OF HOSPITAL STAY    Presenting Problem/History of Present Illness  Term pregnancy [Z34.80]      Hospital Course/Operative Procedures Performed  Procedure(s):  C/Section  Consults: none

## 2018-06-18 NOTE — PROGRESS NOTES
[facility].  Spoke with patient who agreed to well mom baby visit. Referral processed.

## 2018-06-18 NOTE — LACTATION NOTE
Joshua has large breasts and large nipples  I recommended that she pump the milk from her breasts and feed the milk to the baby by bottle.  I explained that there are larger flange sizes and that she would probably need the 36 size.  I gave her the handout on large breasts and discussed it with her.  I told her to air dry her nipples after each pumping session.  I explained that in time the baby would grow bigger and would be able to breastfeed.

## 2018-06-18 NOTE — PROGRESS NOTES
Obstetrics Postpartum Progress Note    Events  No acute events overnight.    Subjective  Pain: no  Bleeding: lochia moderate  Diet: taking regular diet  Voiding: without difficulty  Bowel: passing flatus  Ambulating: as tolerated    Vitals  Temp:  [36.6 °C (97.8 °F)-36.8 °C (98.3 °F)] 36.8 °C (98.3 °F)  Heart Rate:  [70-81] 70  Resp:  [16-18] 18  BP: (117-120)/(57-68) 117/63    I&O  No intake or output data in the 24 hours ending 18 0742    Physical Exam  General: well  Heart: Regular rate and rhythm  Lungs: Clear to auscultation bilaterally  Abdomen: soft, nondistended, non-tender  Fundus: firm  Incision: healing well  Perineum: deferred  Extremities: symmetric    Labs  Labs Reviewed:  Lab Results   Component Value Date    ABO O 06/15/2018    LABRH Positive 06/15/2018        Assessment/Plan   Problem-based Assessment and Plan    Joshua Garcia is a 32 y.o.  postop day 3 s/p , Low Transverse .    1. Vital Signs: stable  2. Hemodynamics: stable, anemia but stable - cont feso4  3. Pain: controlled  4. VTE Assessment: Early Ambulation  5. Vaccinations/Rhogam: rhogam not indicated   6. Post care: meeting all goals     Shayna Padron MD

## 2020-02-07 ENCOUNTER — LAB REQUISITION (OUTPATIENT)
Dept: LAB | Facility: HOSPITAL | Age: 35
End: 2020-02-07
Attending: OBSTETRICS & GYNECOLOGY
Payer: COMMERCIAL

## 2020-02-07 DIAGNOSIS — Z01.419 ENCOUNTER FOR GYNECOLOGICAL EXAMINATION (GENERAL) (ROUTINE) WITHOUT ABNORMAL FINDINGS: ICD-10-CM

## 2020-02-07 PROCEDURE — 87624 HPV HI-RISK TYP POOLED RSLT: CPT | Performed by: OBSTETRICS & GYNECOLOGY

## 2020-02-07 PROCEDURE — G0145 SCR C/V CYTO,THINLAYER,RESCR: HCPCS | Performed by: OBSTETRICS & GYNECOLOGY

## 2020-02-25 LAB
CASE RPRT: NORMAL
CLINICAL INFO: NORMAL
CLINICAL INFO: NORMAL
HPV HR 12 DNA CVX QL NAA+PROBE: NEGATIVE
HPV16 DNA SPEC QL NAA+PROBE: NEGATIVE
HPV18 DNA SPEC QL NAA+PROBE: NEGATIVE
LMP START DATE: NORMAL
THIN PREP CVX: NORMAL

## 2021-01-07 LAB
HBV SURFACE AG SER QL: NONREACTIVE
HIV 1+2 AB+HIV1 P24 AG SERPL QL IA: NONREACTIVE
QST CHLAMYDIA TRACHOMATIS RNA, TMA: NEGATIVE
QST NEISSERIA GONORRHOEAE RNA, TMA: NEGATIVE
RUBELLA IGG SCREEN: NORMAL
T PALLIDUM AB SER QL IF: NONREACTIVE

## 2021-01-08 LAB
D AG BLD QL: POSITIVE
EXTERNAL ABO: NORMAL

## 2021-01-11 ENCOUNTER — TRANSCRIBE ORDERS (OUTPATIENT)
Dept: SCHEDULING | Age: 36
End: 2021-01-11

## 2021-01-13 ENCOUNTER — TRANSCRIBE ORDERS (OUTPATIENT)
Dept: SCHEDULING | Age: 36
End: 2021-01-13

## 2021-01-13 DIAGNOSIS — Z36.9 ENCOUNTER FOR ANTENATAL SCREENING OF MOTHER: Primary | ICD-10-CM

## 2021-01-13 DIAGNOSIS — Z36.82 ENCOUNTER FOR ANTENATAL SCREENING FOR NUCHAL TRANSLUCENCY: ICD-10-CM

## 2021-01-13 DIAGNOSIS — O09.521 SUPERVISION OF ELDERLY MULTIGRAVIDA, FIRST TRIMESTER: ICD-10-CM

## 2021-01-27 ENCOUNTER — HOSPITAL ENCOUNTER (OUTPATIENT)
Dept: PERINATAL CARE | Facility: HOSPITAL | Age: 36
Discharge: HOME | End: 2021-01-27
Attending: ADVANCED PRACTICE MIDWIFE
Payer: COMMERCIAL

## 2021-01-27 DIAGNOSIS — Z36.82 ENCOUNTER FOR ANTENATAL SCREENING FOR NUCHAL TRANSLUCENCY: ICD-10-CM

## 2021-01-27 DIAGNOSIS — Z36.3 ANTENATAL SCREENING FOR MALFORMATION USING ULTRASONICS: ICD-10-CM

## 2021-01-27 DIAGNOSIS — O09.521 AMA (ADVANCED MATERNAL AGE) MULTIGRAVIDA 35+, FIRST TRIMESTER: ICD-10-CM

## 2021-01-27 DIAGNOSIS — Z36.9 ENCOUNTER FOR ANTENATAL SCREENING OF MOTHER: ICD-10-CM

## 2021-01-27 DIAGNOSIS — O09.521 SUPERVISION OF ELDERLY MULTIGRAVIDA, FIRST TRIMESTER: ICD-10-CM

## 2021-01-27 DIAGNOSIS — Z3A.12 12 WEEKS GESTATION OF PREGNANCY: ICD-10-CM

## 2021-01-27 PROCEDURE — 76813 OB US NUCHAL MEAS 1 GEST: CPT

## 2021-03-05 ENCOUNTER — TRANSCRIBE ORDERS (OUTPATIENT)
Dept: SCHEDULING | Age: 36
End: 2021-03-05

## 2021-03-05 DIAGNOSIS — O09.522 SUPERVISION OF ELDERLY MULTIGRAVIDA, SECOND TRIMESTER: Primary | ICD-10-CM

## 2021-03-05 DIAGNOSIS — Z36.9 ENCOUNTER FOR ANTENATAL SCREENING OF MOTHER: ICD-10-CM

## 2021-04-14 ENCOUNTER — HOSPITAL ENCOUNTER (OUTPATIENT)
Dept: PERINATAL CARE | Facility: HOSPITAL | Age: 36
Discharge: HOME | End: 2021-04-14
Attending: OBSTETRICS & GYNECOLOGY
Payer: COMMERCIAL

## 2021-04-14 DIAGNOSIS — Z3A.23 23 WEEKS GESTATION OF PREGNANCY: ICD-10-CM

## 2021-04-14 DIAGNOSIS — Z36.3 ANTENATAL SCREENING FOR MALFORMATION USING ULTRASONICS: ICD-10-CM

## 2021-04-14 DIAGNOSIS — O35.9XX0 SUSPECTED FETAL ABNORMALITY AFFECTING MANAGEMENT OF MOTHER, SINGLE OR UNSPECIFIED FETUS: ICD-10-CM

## 2021-04-14 DIAGNOSIS — O09.522 SUPERVISION OF ELDERLY MULTIGRAVIDA, SECOND TRIMESTER: ICD-10-CM

## 2021-04-14 DIAGNOSIS — Z36.9 ENCOUNTER FOR ANTENATAL SCREENING OF MOTHER: ICD-10-CM

## 2021-04-14 DIAGNOSIS — O09.522 AMA (ADVANCED MATERNAL AGE) MULTIGRAVIDA 35+, SECOND TRIMESTER: ICD-10-CM

## 2021-04-14 PROCEDURE — 76811 OB US DETAILED SNGL FETUS: CPT

## 2021-05-15 LAB
GLUCOSE 1H P GLC SERPL-MCNC: 162 MG/DL (ref 65–139)
RPR SER QL: NORMAL

## 2021-06-01 LAB
GLUCOSE 1H P GLC SERPL-MCNC: 203 MG/DL (ref 51–180)
GLUCOSE 2H P GLC SERPL-MCNC: 150 MG/DL (ref 51–155)
GLUCOSE 3H P GLC SERPL-MCNC: 113 MG/DL (ref 51–140)
GLUCOSE P FAST SERPL-MCNC: 103 MG/DL (ref 51–95)

## 2021-06-21 ENCOUNTER — TRANSCRIBE ORDERS (OUTPATIENT)
Dept: SCHEDULING | Age: 36
End: 2021-06-21

## 2021-06-21 DIAGNOSIS — O24.410 GESTATIONAL DIABETES MELLITUS IN PREGNANCY, DIET CONTROLLED: Primary | ICD-10-CM

## 2021-06-23 ENCOUNTER — TRANSCRIBE ORDERS (OUTPATIENT)
Dept: SCHEDULING | Age: 36
End: 2021-06-23

## 2021-06-23 DIAGNOSIS — O24.410 GESTATIONAL DIABETES MELLITUS IN PREGNANCY, DIET CONTROLLED: Primary | ICD-10-CM

## 2021-06-29 ENCOUNTER — TELEMEDICINE (OUTPATIENT)
Dept: NUTRITION | Facility: HOSPITAL | Age: 36
End: 2021-06-29
Attending: NURSE PRACTITIONER
Payer: COMMERCIAL

## 2021-06-29 VITALS — HEIGHT: 64 IN | WEIGHT: 215 LBS | BODY MASS INDEX: 36.7 KG/M2

## 2021-06-29 DIAGNOSIS — O24.410 GESTATIONAL DIABETES MELLITUS IN PREGNANCY, DIET CONTROLLED: ICD-10-CM

## 2021-06-29 DIAGNOSIS — O24.410 DIET CONTROLLED GESTATIONAL DIABETES MELLITUS (GDM) IN THIRD TRIMESTER: ICD-10-CM

## 2021-06-29 PROCEDURE — 97802 MEDICAL NUTRITION INDIV IN: CPT | Performed by: DIETITIAN, REGISTERED

## 2021-06-29 NOTE — PROGRESS NOTES
Verification of Patient Location:  The patient affirms they are currently located in the following state: [place]    Request for Consent:    Audio and Video Encounter   Hello, my name is Nabila Diego RN,,[facility].  Before we proceed, can you please verify your identification by telling me your full name and date of birth? Yesenia Garcia [birth year]  Can you tell me who is in the room with you? Alone  You and I are about to have a telemedicine check-in or visit because you have requested it.  This is a live video-conference.  I am a real person, speaking to you in real time.  There is no one else with me on the video-conference.  However, when we use ("[employer], etc) it is important for you to know that the video-conference may not be secure or private.  I am not recording this conversation and I am asking you not to record it.  This telemedicine visit will be billed to your health insurance or you, if you are self-insured.  You understand you will be responsible for any copayments or coinsurances that apply to your telemedicine visit.  Communication platform used for this encounter:  Zoom alone (not linked to Epic or [employer])     Before starting our telemedicine visit, I am required to get your consent for this virtual check-in or visit by telemedicine. Do you consent?      Patient Response to Request for Consent:  Yes      Visit Documentation:  Subjective     Patient ID: Joshua Garcia is a 35 y.o. female.  1985      HPI    The following have been reviewed and updated as appropriate in this visit:  Allergies  Meds  Problems       Review of Systems      Assessment/Plan   Diagnoses and all orders for this visit:    Gestational diabetes mellitus in pregnancy, diet controlled  -     Ambulatory referral to Diabetic Education        Time Spent:  I spent 30 minutes on this date of service performing the following activities: providing counseling and education.

## 2021-06-29 NOTE — PROGRESS NOTES
Verification of Patient Location:  The patient affirms they are currently located in the following state: [place]    Request for Consent:    Audio and Video Encounter   Antoine, my name is Angela Hickman, PH REGISTERED DIETITIAN.  Before we proceed, can you please verify your identification by telling me your full name and date of birth? Joshua Garcia 1985 Can you tell me who is in the room with you? Alone    You and I are about to have a telemedicine check-in or visit because you have requested it.  This is a live video-conference.  I am a real person, speaking to you in real time.  There is no one else with me on the video-conference.  However, when we use ([employer], [employer], etc) it is important for you to know that the video-conference may not be secure or private.  I am not recording this conversation and I am asking you not to record it.  This telemedicine visit will be billed to your health insurance or you, if you are self-insured.  You understand you will be responsible for any copayments or coinsurances that apply to your telemedicine visit.  Communication platform used for this encounter:  Zoom alone (not linked to Epic or [employer]     Before starting our telemedicine visit, I am required to get your consent for this virtual check-in or visit by telemedicine. Do you consent?      Patient Response to Request for Consent:  Yes      Visit Documentation:  Subjective     Patient ID: Joshua Garcia is a 35 y.o. female.  1985      HPI    The following have been reviewed and updated as appropriate in this visit:  Allergies  Meds  Problems       Review of Systems      Assessment/Plan      Calculated needs: 2007 calories/day (40% from carbohydrate)    Stage of readiness: Preparation  No special needs identified.    Physical findings: None    Nutrition intervention: Patient educated on carbohydrate counting, meal spacing, label reading, food safety guidellines during pregnancy, protein, fiber, and  fat. Patient verbalized understanding. Education material sent via provided e-mail.    PES: Food and nutrition-related knowledge deficit related to new medical diagnosis as evidenced by need for GDM diet education.    Diagnoses and all orders for this visit:    Gestational diabetes mellitus in pregnancy, diet controlled  -     Ambulatory referral to Diabetic Education    Diet controlled gestational diabetes mellitus (GDM) in third trimester        Time Spent:  I spent 40 minutes on this date of service performing the following activities: providing counseling and education.

## 2021-06-29 NOTE — PATIENT INSTRUCTIONS
Gestational Diabetes Mellitus, Self Care  Caring for yourself after you have been diagnosed with gestational diabetes (gestational diabetes mellitus) means keeping your blood sugar (glucose) under control. You can do that with a balance of:  · Nutrition.  · Exercise.  · Lifestyle changes.  · Medicines or insulin, if necessary.  · Support from your team of health care providers and others.  The following information explains what you need to know to manage your gestational diabetes at home.  What are the risks?  If gestational diabetes is treated, it is unlikely to cause problems. If it is not controlled with treatment, it may cause problems during labor and delivery, and some of those problems can be harmful to the unborn baby (fetus) and the mother. Uncontrolled gestational diabetes may also cause the  baby to have breathing problems and low blood glucose.  Women who get gestational diabetes are more likely to develop it if they get pregnant again, and they are more likely to develop type 2 diabetes in the future.  How to monitor blood glucose      · Check your blood glucose every day during your pregnancy. Do this as often as told by your health care provider.  · Contact your health care provider if your blood glucose is above your target for two tests in a row.  Your health care provider will set individualized treatment goals for you. Generally, the goal of treatment is to maintain the following blood glucose levels during pregnancy:  · Before meals (preprandial): at or below 95 mg/dL (5.3 mmol/L).  · After meals (postprandial):  ? One hour after a meal: at or below 140 mg/dL (7.8 mmol/L).  ? Two hours after a meal: at or below 120 mg/dL (6.7 mmol/L).  · A1c (hemoglobin A1c) level: 6-6.5%.  How to manage hyperglycemia and hypoglycemia  Hyperglycemia symptoms  Hyperglycemia, also called high blood glucose, occurs when blood glucose is too high. Make sure you know the early signs of hyperglycemia, such  as:  · Increased thirst.  · Hunger.  · Feeling very tired.  · Needing to urinate more often than usual.  · Blurry vision.  Hypoglycemia symptoms  Hypoglycemia, also called low blood glucose, occurs with a blood glucose level at or below 70 mg/dL (3.9 mmol/L). The risk for hypoglycemia increases during or after exercise, during sleep, during illness, and when skipping meals or not eating for a long time (fasting). Symptoms may include:  · Hunger.  · Anxiety.  · Sweating and feeling clammy.  · Confusion.  · Dizziness or feeling light-headed.  · Sleepiness.  · Nausea.  · Increased heart rate.  · Headache.  · Blurry vision.  · Irritability.  · Tingling or numbness around the mouth, lips, or tongue.  · A change in coordination.  · Restless sleep.  · Fainting.  · Seizure.  It is important to know the symptoms of hypoglycemia and treat it right away. Always have a 15-gram rapid-acting carbohydrate snack with you to treat low blood glucose. Family members and close friends should also know the symptoms and should understand how to treat hypoglycemia, in case you are not able to treat yourself.  Treating hypoglycemia  If you are alert and able to swallow safely, follow the 15:15 rule:  · Take 15 grams of a rapid-acting carbohydrate. Talk with your health care provider about how much you should take.  · Rapid-acting options include:  ? Glucose pills (take 15 grams).  ? 6-8 pieces of hard candy.  ? 4-6 oz (120-150 mL) of fruit juice.  ? 4-6 oz (120-150 mL) of regular (not diet) soda.  ? 1 Tbsp (15 mL) honey or sugar.  · Check your blood glucose 15 minutes after you take the carbohydrate.  · If the repeat blood glucose level is still at or below 70 mg/dL (3.9 mmol/L), take 15 grams of a carbohydrate again.  · If your blood glucose level does not increase above 70 mg/dL (3.9 mmol/L) after 3 tries, seek emergency medical care.  · After your blood glucose level returns to normal, eat a meal or a snack within 1 hour.  Treating  severe hypoglycemia  Severe hypoglycemia is when your blood glucose level is at or below 54 mg/dL (3 mmol/L). Severe hypoglycemia is an emergency. Do not wait to see if the symptoms will go away. Get medical help right away. Call your local emergency services (911 in the [place]  If you have severe hypoglycemia and you cannot eat or drink, you may need an injection of glucagon. A family member or close friend should learn how to check your blood glucose and how to give you a glucagon injection. Ask your health care provider if you need to have an emergency glucagon injection kit available.  Severe hypoglycemia may need to be treated in a hospital. The treatment may include getting glucose through an IV. You may also need treatment for the cause of your hypoglycemia.  Follow these instructions at home:  Take diabetes medicines as told  · If your health care provider prescribed insulin or diabetes medicines, take them every day.  · Do not run out of insulin or other diabetes medicines that you take. Plan ahead so you always have these available.  · If you use insulin, adjust your dosage based on how physically active you are and what foods you eat. Your health care provider will tell you how to adjust your dosage.  Make healthy food choices      The things that you eat and drink affect your blood glucose (and your insulin dose, if this applies). Making good choices helps to control your diabetes and prevent other health problems. A healthy meal plan includes eating lean proteins, complex carbohydrates, fresh fruits and vegetables, low-fat dairy products, and healthy fats.  Make an appointment to see a diet and nutrition specialist (registered dietitian) to help you create an eating plan that is right for you. Make sure that you:  · Follow instructions from your health care provider about eating or drinking restrictions.  · Drink enough fluid to keep your urine pale yellow.  · Eat healthy snacks between nutritious  meals.  · Keep a record of the carbohydrates that you eat. Do this by reading food labels and learning the standard serving sizes of foods.  · Follow your sick day plan whenever you cannot eat or drink as usual. Make this plan in advance with your health care provider.    Stay active  · Do 30 or more minutes of physical activity a day, or as much physical activity as your health care provider recommends during your pregnancy.  ? Doing 10 minutes of exercise starting 30 minutes after each meal may help to control postprandial blood glucose levels.  · If you start a new exercise or activity, work with your health care provider to adjust your insulin, medicines, or food intake as needed.  Make healthy lifestyle choices  · Do not drink alcohol.  · Do not use any tobacco products, such as cigarettes, chewing tobacco, and e-cigarettes. If you need help quitting, ask your health care provider.  · Learn to manage stress. If you need help with this, ask your health care provider.  Care for your body  · Keep your immunizations up to date.  · Brush your teeth and gums two times a day, and floss one or more times a day. Visit your dentist one or more times every 6 months.  · Maintain a healthy weight during your pregnancy.  General instructions  · Take over-the-counter and prescription medicines only as told by your health care provider.  · Talk with your health care provider about your risk for high blood pressure during pregnancy (preeclampsia or eclampsia).  · Share your diabetes management plan with people in your workplace, school, and household.  · Check your urine for ketones during your pregnancy when you are ill and as told by your health care provider.  · Carry a medical alert card or wear medical alert jewelry that says you have gestational diabetes.  · Keep all follow-up visits during your pregnancy (prenatal) and after delivery () as told by your health care provider. This is important.  Get the care that  you need after delivery  · Have your blood glucose level checked 4-12 weeks after delivery. This is done with an oral glucose tolerance test (OGTT).  · Get screened for diabetes at least every 3 years, or as often as told by your health care provider.  Questions to ask your health care provider  · Do I need to meet with a diabetes educator?  · Where can I find a support group for people with gestational diabetes?  Where to find more information  For more information about gestational diabetes, visit:  · American Diabetes Association (ADA): www.diabetes.org  · Centers for Disease Control and Prevention (CDC): www.cdc.gov  Summary  · Check your blood glucose every day during your pregnancy. Do this as often as told by your health care provider.  · If your health care provider prescribed insulin or diabetes medicines, take them every day as told.  · Keep all follow-up visits during your pregnancy (prenatal) and after delivery () as told by your health care provider. This is important.  · Have your blood glucose level checked 4-12 weeks after delivery.  This information is not intended to replace advice given to you by your health care provider. Make sure you discuss any questions you have with your health care provider.  Document Released: 04/10/2017 Document Revised: 06/10/2019 Document Reviewed: 2017  [employer] Patient Education © 2019 [employer]

## 2021-06-29 NOTE — LETTER
June 29, 2021     ABIGAIL Salas  [address]  [facility] [address]    Patient: Joshua Garcia   YOB: 1985   Date of Visit: 6/29/2021       Dear Ria HAYES    Thank you for referring Joshua Garcia to me for evaluation. Below are my notes for this consultation.    If you have questions, please do not hesitate to call me.       Sincerely,    Nabila Diego RN, [facility]        CC: No Recipients    Verification of Patient Location:  The patient affirms they are currently located in the following state: [place]    Request for Consent:    Audio and Video Encounter   Hello, my name is Nabila Diego RN,,[facility].  Before we proceed, can you please verify your identification by telling me your full name and date of birth? Yesenia Garcia [birth year]  Can you tell me who is in the room with you? Alone  You and I are about to have a telemedicine check-in or visit because you have requested it.  This is a live video-conference.  I am a real person, speaking to you in real time.  There is no one else with me on the video-conference.  However, when we use ([employer], [employer]e, etc) it is important for you to know that the video-conference may not be secure or private.  I am not recording this conversation and I am asking you not to record it.  This telemedicine visit will be billed to your health insurance or you, if you are self-insured.  You understand you will be responsible for any copayments or coinsurances that apply to your telemedicine visit.  Communication platform used for this encounter:  Zoom alone (not linked to Epic or [employer])     Before starting our telemedicine visit, I am required to get your consent for this virtual check-in or visit by telemedicine. Do you consent?      Patient Response to Request for Consent:  Yes      Visit Documentation:  Subjective     Patient ID: Joshua Garcia is a 35 y.o. female.  1985      HPI    The following have been  reviewed and updated as appropriate in this visit:  Allergies  Meds  Problems       Review of Systems      Assessment/Plan   Diagnoses and all orders for this visit:    Gestational diabetes mellitus in pregnancy, diet controlled  -     Ambulatory referral to Diabetic Education        Time Spent:  I spent 30 minutes on this date of service performing the following activities: providing counseling and education.    Diabetes Management Program    Subjective      Patient ID: Ms. Joshua Garcia is a 35 y.o. female who has been referred for gestational diabetes instruction.      Diabetes Medication/Instruction:   Current Outpatient Medications:   •  ferrous sulfate 325 mg (65 mg iron) tablet, Take 65 mg by mouth 2 (two) times a day with meals., Disp: , Rfl:   •  prenatal vits96/iron fum/folic (PRE- VITAMIN) 27 mg iron- 800 mcg tablet, Take 1 tablet by mouth daily., Disp: , Rfl:       SBGM:  Meter Freestlye Lite  using    Schedule:  7 days per week     4 Tests per day fasting and 2hrs. pp  Comments: I reviewed self blood glucose monitoring technique. Alba states fasting blood sugars have been elevated 100's . But she does not sleep well and has not been having a bedtime snack.    Physicial Activity: Discussed benefits of exercise and various options  Comments: States no limitations to physical activity    Meal Planning: Given suggested distribution per registered dietitian     Plan: Instructed to follow-up with the [facility] perinatology department for continued monitoring of blood sugars for the duration of the pregnancy  Goals     • Follow your diet plan as advised by your nutritionist     • Record your blood sugar as directed          Barriers:  No identified barriers    Plans to overcome barriers: Written materials    Teaching directed to:patient   This consult was conducted as a telemedicine visit using Zoom. Instructions were sent via e-mail. This is Alba's third pregnancy, but first time with gestational  diabetes. She was very receptive tot he instruction.    Nabila Diego RN, [facility]

## 2021-06-29 NOTE — PROGRESS NOTES
Diabetes Management Program    Subjective      Patient ID: Ms. Joshua Garcia is a 35 y.o. female who has been referred for gestational diabetes instruction.      Diabetes Medication/Instruction:   Current Outpatient Medications:   •  ferrous sulfate 325 mg (65 mg iron) tablet, Take 65 mg by mouth 2 (two) times a day with meals., Disp: , Rfl:   •  prenatal vits96/iron fum/folic (PRE- VITAMIN) 27 mg iron- 800 mcg tablet, Take 1 tablet by mouth daily., Disp: , Rfl:       SBGM:  Meter Freestly[employer] Lite  using    Schedule:  7 days per week     4 Tests per day fasting and 2hrs. pp  Comments: I reviewed self blood glucose monitoring technique. Alba states fasting blood sugars have been elevated 100's . But she does not sleep well and has not been having a bedtime snack.    Physicial Activity: Discussed benefits of exercise and various options  Comments: States no limitations to physical activity    Meal Planning: Given suggested distribution per registered dietitian     Plan: Instructed to follow-up with the [facility] perinatology department for continued monitoring of blood sugars for the duration of the pregnancy  Goals     • Follow your diet plan as advised by your nutritionist     • Record your blood sugar as directed          Barriers:  No identified barriers    Plans to overcome barriers: Written materials    Teaching directed to:patient   This consult was conducted as a telemedicine visit using Zoom. Instructions were sent via e-mail. This is Alba's third pregnancy, but first time with gestational diabetes. She was very receptive tot he instruction.    Nabila Diego RN, [facility]

## 2021-07-01 ENCOUNTER — DOCUMENTATION (OUTPATIENT)
Dept: PERINATAL CARE | Facility: HOSPITAL | Age: 36
End: 2021-07-01

## 2021-07-01 NOTE — PROGRESS NOTES
Email received from pt to set up weekly monitoring  Welcome packet sent to pt email provided  Pt to start sending logs for review next Thursday  Pt to call with questions.  Contact info given

## 2021-07-05 ENCOUNTER — HOSPITAL ENCOUNTER (OUTPATIENT)
Dept: PERINATAL CARE | Facility: HOSPITAL | Age: 36
Discharge: HOME | End: 2021-07-05
Attending: OBSTETRICS & GYNECOLOGY
Payer: COMMERCIAL

## 2021-07-05 ENCOUNTER — TRANSCRIBE ORDERS (OUTPATIENT)
Dept: PERINATAL CARE | Facility: HOSPITAL | Age: 36
End: 2021-07-05

## 2021-07-05 DIAGNOSIS — Z3A.34 34 WEEKS GESTATION OF PREGNANCY: ICD-10-CM

## 2021-07-05 DIAGNOSIS — O99.213 OBESITY AFFECTING PREGNANCY IN THIRD TRIMESTER: ICD-10-CM

## 2021-07-05 DIAGNOSIS — O09.93 SUPERVISION OF HIGH RISK PREGNANCY IN THIRD TRIMESTER: ICD-10-CM

## 2021-07-05 DIAGNOSIS — O24.410 GESTATIONAL DIABETES MELLITUS IN PREGNANCY, DIET CONTROLLED: ICD-10-CM

## 2021-07-05 DIAGNOSIS — O24.410 DIET CONTROLLED GESTATIONAL DIABETES MELLITUS (GDM) IN THIRD TRIMESTER: ICD-10-CM

## 2021-07-05 DIAGNOSIS — O24.410 GESTATIONAL DIABETES MELLITUS IN PREGNANCY, DIET CONTROLLED: Primary | ICD-10-CM

## 2021-07-05 PROCEDURE — 76805 OB US >/= 14 WKS SNGL FETUS: CPT

## 2021-07-08 ENCOUNTER — DOCUMENTATION (OUTPATIENT)
Dept: PERINATAL CARE | Facility: HOSPITAL | Age: 36
End: 2021-07-08

## 2021-07-08 NOTE — PROGRESS NOTES
Call to pt regarding 1st blood sugar log results.  Instructions to change HS snack timing and options given.  Pt to make adjustments and send updated log next week and call with questions

## 2021-07-15 ENCOUNTER — DOCUMENTATION (OUTPATIENT)
Dept: PERINATAL CARE | Facility: HOSPITAL | Age: 36
End: 2021-07-15

## 2021-07-15 LAB — GP B STREP SPEC QL CULT: NORMAL

## 2021-07-15 NOTE — PROGRESS NOTES
Email sent to pt regarding GDM log review. Per Dr. Salazar Pt to continue with diet, making recommeded adjustments to dinner diet, and email updated log next week.  Call with questions if needed.

## 2021-07-22 ENCOUNTER — DOCUMENTATION (OUTPATIENT)
Dept: PERINATAL CARE | Facility: HOSPITAL | Age: 36
End: 2021-07-22

## 2021-07-29 ENCOUNTER — DOCUMENTATION (OUTPATIENT)
Dept: PERINATAL CARE | Facility: HOSPITAL | Age: 36
End: 2021-07-29

## 2021-07-29 ENCOUNTER — HOSPITAL ENCOUNTER (OUTPATIENT)
Dept: PERINATAL CARE | Facility: HOSPITAL | Age: 36
Discharge: HOME | End: 2021-07-29
Attending: OBSTETRICS & GYNECOLOGY
Payer: COMMERCIAL

## 2021-07-29 DIAGNOSIS — Z3A.38 38 WEEKS GESTATION OF PREGNANCY: ICD-10-CM

## 2021-07-29 DIAGNOSIS — O24.410 DIET CONTROLLED GESTATIONAL DIABETES MELLITUS (GDM) IN THIRD TRIMESTER: Primary | ICD-10-CM

## 2021-07-29 PROCEDURE — 76816 OB US FOLLOW-UP PER FETUS: CPT

## 2021-08-02 ENCOUNTER — TRANSCRIBE ORDERS (OUTPATIENT)
Dept: REGISTRATION | Facility: HOSPITAL | Age: 36
End: 2021-08-02

## 2021-08-02 ENCOUNTER — APPOINTMENT (OUTPATIENT)
Dept: LAB | Facility: HOSPITAL | Age: 36
End: 2021-08-02
Attending: OBSTETRICS & GYNECOLOGY
Payer: COMMERCIAL

## 2021-08-02 ENCOUNTER — APPOINTMENT (OUTPATIENT)
Dept: PREADMISSION TESTING | Facility: HOSPITAL | Age: 36
End: 2021-08-02
Attending: OBSTETRICS & GYNECOLOGY
Payer: COMMERCIAL

## 2021-08-02 VITALS
OXYGEN SATURATION: 100 % | WEIGHT: 212.5 LBS | BODY MASS INDEX: 35.4 KG/M2 | HEART RATE: 78 BPM | DIASTOLIC BLOOD PRESSURE: 62 MMHG | HEIGHT: 65 IN | SYSTOLIC BLOOD PRESSURE: 119 MMHG | RESPIRATION RATE: 18 BRPM | TEMPERATURE: 98.7 F

## 2021-08-02 DIAGNOSIS — O34.211 MATERNAL CARE FOR LOW TRANSVERSE SCAR FROM PREVIOUS CESAREAN DELIVERY: Primary | ICD-10-CM

## 2021-08-02 DIAGNOSIS — O34.211 MATERNAL CARE FOR LOW TRANSVERSE SCAR FROM PREVIOUS CESAREAN DELIVERY: ICD-10-CM

## 2021-08-02 DIAGNOSIS — Z01.818 PREOP TESTING: Primary | ICD-10-CM

## 2021-08-02 DIAGNOSIS — Z11.59 ENCOUNTER FOR SCREENING FOR OTHER VIRAL DISEASES: Primary | ICD-10-CM

## 2021-08-02 LAB
ABO + RH BLD: NORMAL
ANION GAP SERPL CALC-SCNC: 8 MEQ/L (ref 3–15)
BLD GP AB SCN SERPL QL: NEGATIVE
BUN SERPL-MCNC: 10 MG/DL (ref 8–20)
CALCIUM SERPL-MCNC: 8.6 MG/DL (ref 8.9–10.3)
CHLORIDE SERPL-SCNC: 107 MEQ/L (ref 98–109)
CO2 SERPL-SCNC: 20 MEQ/L (ref 22–32)
CREAT SERPL-MCNC: 0.8 MG/DL (ref 0.6–1.1)
D AG BLD QL: POSITIVE
ERYTHROCYTE [DISTWIDTH] IN BLOOD BY AUTOMATED COUNT: 15.4 % (ref 11.7–14.4)
GFR SERPL CREATININE-BSD FRML MDRD: >60 ML/MIN/1.73M*2
GLUCOSE SERPL-MCNC: 124 MG/DL (ref 70–99)
HCT VFR BLDCO AUTO: 29.4 % (ref 35–45)
HGB BLD-MCNC: 9.7 G/DL (ref 11.8–15.7)
LABORATORY COMMENT REPORT: NORMAL
MCH RBC QN AUTO: 28.2 PG (ref 28–33.2)
MCHC RBC AUTO-ENTMCNC: 33 G/DL (ref 32.2–35.5)
MCV RBC AUTO: 85.5 FL (ref 83–98)
PDW BLD AUTO: 11.2 FL (ref 9.4–12.3)
PLATELET # BLD AUTO: 196 K/UL (ref 150–369)
POTASSIUM SERPL-SCNC: 3.7 MEQ/L (ref 3.6–5.1)
RBC # BLD AUTO: 3.44 M/UL (ref 3.93–5.22)
SODIUM SERPL-SCNC: 135 MEQ/L (ref 136–144)
SPECIMEN EXP DATE BLD: NORMAL
WBC # BLD AUTO: 6.42 K/UL (ref 3.8–10.5)

## 2021-08-02 PROCEDURE — 80048 BASIC METABOLIC PNL TOTAL CA: CPT

## 2021-08-02 PROCEDURE — 36415 COLL VENOUS BLD VENIPUNCTURE: CPT

## 2021-08-02 PROCEDURE — 86850 RBC ANTIBODY SCREEN: CPT

## 2021-08-02 PROCEDURE — 85027 COMPLETE CBC AUTOMATED: CPT

## 2021-08-02 ASSESSMENT — PAIN SCALES - GENERAL: PAINLEVEL: 0-NO PAIN

## 2021-08-02 NOTE — PRE-PROCEDURE INSTRUCTIONS
1. You will be called between 3pm-7pm one business day before your procedure to tell you where and when to report. If you do not receive a phone call by 7pm, please call the Admissions office at [phone].    2. Please report to Admissions or Short Procedure Unit on the day of your procedure.   Detailed directions will be given to you when you are called with arrival time.        3. Please follow the following fasting guidelines:     No solid food EIGHT HOURS prior to surgery.  Unlimited clear liquids, meaning water or PLAIN black coffee WITHOUT any milk, cream, sugar, or sweetener are permitted up to TWO HOURS prior to arrival at the hospital.    4. Early on the morning of the procedure please take your usual dose of the listed medications with a sip of water: NONE    5. Other Instructions: You may brush your teeth the morning of the procedure. Rinse and spit, do not swallow.  Bring a list of your medications with dosages.  Use surgical wash as directed. SHOWER NIGHT BEFORE AND MORNING OF PROCEDURE WITH WASH PROVIDED      6. If you develop a cold, cough, fever, rash, or other symptom prior to the day of the procedure, please report it to your physician immediately.    7. If you need to cancel the procedure for any reason, please contact your physician.    8. Make arrangements to have someone drive you home from the procedure. If you have not arranged for transportation home, your surgery may be cancelled.     9. You may not take public transportation unless accompanied by a responsible person.    10. You may not drive a car or operate complex or potentially dangerous machinery for 24 hours following anesthesia and/or sedation.    11.  If it is medically necessary for you to have a longer stay, you will be informed as soon as the decision is made.    12. Only bring essential items to the hospital.  Do not wear or bring anything of value to the hospital including jewelry of any kind, money, or wallet. Do not wear  make-up or contact lenses.  DO NOT BRING MEDICATIONS FROM HOME. DO bring your hearing aids, glasses, and a case    13. No lotion, creams, powders, or oils on skin the morning of procedure     14. Dress in comfortable clothes.    15.  If instructed, please bring a copy of your Advanced Directive (Living Will/Durable Power of ) on the day of your procedure.     16. Patients need to quarantine from the time of PAT COVID test to day of surgery, regardless of COVID vaccine status.      17. Ensuring your safety at all times is a very important part of our [facility] Culture of Safety. After having surgery and sedation, you are at risk for falling and balance issues. Although you may feel awake, the effects of the medication can last up to 24 hours after anesthesia. If you need to use the bathroom during your recovery period, nursing staff will escort you there and stay with you to ensure your safety.    Pre operative instructions given as per protocol.  Form explained by: ABIGAIL Stone    I have read and understand the above information. I have had sufficient opportunity to ask questions I might have and they have been answered to my satisfaction. I agree to comply with the Patient Responsibilities listed above and have received a copy of this form.

## 2021-08-02 NOTE — H&P
Preadmission Testing-  Pre-Operative H&P       Patient Name: Joshua Garcia  Referring Surgeon: Ted Blair    Reason for Referral: Pre-Operative Evaluation  Surgical Procedure:  Section Repeat  Operative Date: 21  Other Providers:      PCP: Lorne Fry MD        HISTORY OF PRESENT ILLNESS      Joshua Garcia is a 35 y.o. female presenting today to the [facility] at [facility] for pre-operative evaluation prior to planned surgery.    Patient reports positive regular fetal movement.  Denies leakage of fluid, bleeding, cramping, regular contractions.  Denies headaches, upper quadrant pain, nausea, vomiting, sudden increase in edema, facial edema, or vision changes. Plan for repeat .  Patient denies issues with anesthesia or surgery with c-sections in  and .        In regards to medical history:  Gestational diabetes - does not require insulin; monitors blood glucose at home  Anemia- also anemic prior to pregnancy; iron supplement daily  GERD- no medications; worse in pregnancies    The patient denies any current or recent chest pain or pressure, dyspnea, cough, sputum, fevers, chills, abdominal pain, nausea, vomiting, diarrhea or other symptoms.     Functionally, the patient is able to ascend a flight or so of stairs with no dyspnea or chest pain. She can walk at least three city blocks without chest pain or shortness of breath.. Functional capacity > 4 METS.    The patient denies, on specific questioning, the following:  No history of MI, valvular disease, arrhythmia,or CHF.  No history of FREDY.  No history of DVT/PE.  No history of COPD.  No history of CVA.  No history of DM. +history gestational diabetes  No history of CKD.   No history of liver disease or cirrhosis.  No history of bleeding disorder.  No history of difficult airway/difficult intubation.  No history of Malignant hyperthermia.  No history of  adverse reaction to anesthesia in the past.    PAST MEDICAL AND SURGICAL HISTORY      Past Medical History:   Diagnosis Date   • Anemia    • GDM (gestational diabetes mellitus)    • GERD (gastroesophageal reflux disease)     during pregnancy   • Wears glasses        Past Surgical History:   Procedure Laterality Date   •  SECTION  ; 2018       MEDICATIONS        Current Outpatient Medications:   •  ferrous sulfate 325 mg (65 mg iron) tablet, Take 65 mg by mouth 2 (two) times a day with meals., Disp: , Rfl:   •  prenatal vits96/iron fum/folic (PRE-DANAE VITAMIN) 27 mg iron- 800 mcg tablet, Take 1 tablet by mouth every morning.  , Disp: , Rfl:     ALLERGIES      No known allergies    FAMILY HISTORY      family history includes Diabetes in her biological father; Hyperlipidemia in her biological father; No Known Problems in her biological mother.    Denies any prior known family history of DVTs/PEs/clotting disorder    SOCIAL HISTORY      Social History     Tobacco Use   • Smoking status: Never Smoker   • Smokeless tobacco: Never Used   Substance Use Topics   • Alcohol use: No   • Drug use: No       REVIEW OF SYSTEMS      Constitution: Negative for decreased appetite, diaphoresis, fever, malaise/fatigue, weight gain and weight loss.   HENT: Negative for hearing loss.    Eyes: Negative for visual disturbance.   Cardiovascular: Negative for chest pain, dyspnea on exertion, irregular heartbeat,ear-syncope, orthopnea, palpitations, paroxysmal nocturnal dyspnea and syncope. +mild leg swelling  Respiratory: Negative for cough, hemoptysis, shortness of breath, sleep disturbances due to breathing and snoring.    Hematologic/Lymphatic: Does not bruise/bleed easily.   Skin: Negative for rash.   Musculoskeletal: Negative for arthritis and joint pain. Negative for myalgias.   Gastrointestinal: Negative for heartburn, hematemesis, hematochezia and melena.   Genitourinary: Negative for hematuria and nocturia.  "  Neurological: Negative for dizziness and light-headedness.   Psychiatric/Behavioral: Negative for altered mental status. The patient does not have insomnia.      All other systems reviewed and negative except as noted in HPI    PHYSICAL EXAMINATION      Visit Vitals  /62   Pulse 78   Temp 37.1 °C (98.7 °F) (Oral)   Resp 18   Ht 1.651 m (5' 5\")   Wt 96.4 kg (212 lb 8 oz)   LMP 2020   SpO2 100%   BMI 35.36 kg/m²     Body mass index is 35.36 kg/m².    Physical Exam  Cardiovascular:      Comments: Fetal heart rate by doppler = 140bpm  Abdominal:      Comments: +gravid   Genitourinary:     Comments: defer  Musculoskeletal:      Right lower le+ Edema present.      Left lower le+ Edema present.       Constitutional: Patient is oriented to person, place, and time. The patient appears well-developed and cooperative. No distress.   HENT:   Head: Normocephalic and atraumatic.   Mouth/Throat: Oropharynx is clear and moist.   Eyes: Pupils are equal, round, and reactive to light. No scleral icterus.   Neck: Normal range of motion. Neck supple.   Cardiovascular: Normal rate and regular rhythm.   No murmur heard.  Pulmonary/Chest: No accessory muscle usage. No tachypnea. No respiratory distress. No decreased breath sounds, wheezes, rhonchi, rales.  Abdominal: Soft. Bowel sounds are normal, exhibits no ascites, and no tenderness.   Neurological: Alert and oriented to person, place, and time.   Skin: Skin is warm, dry and intact.   Psychiatric: Has a normal mood and affect. Speech is normal and behavior is normal. Judgment and thought content normal. Cognition and memory are normal.   Nursing note and vitals reviewed.    LABS / EKG        Labs  Lab Results   Component Value Date    WBC 7.91 2018    HGB 8.4 (L) 2018    HCT 25.8 (L) 2018    MCV 85.4 2018     2018     No results found for: GLUCOSE, CALCIUM, NA, K, CO2, CL, BUN, CREATININE        ECG/Telemetry  Not " indicated    ASSESSMENT AND PLAN         Plan: Section Repeat  CBC, BMP, T&S  COVID-19 testing on admission     In regards to perioperative cardiac risk:  The patient denies any history of ischemic heart disease, denies any history of CHF, denies any history of CVA, is not on pre-operative treatment with insulin, and does not have a pre-operative creatinine > 2 mg/dL.   The Revised Cardiac Risk Index (RCRI) = 0 for this patient which indicates a 0.4% risk of major adverse cardiac event in the perioperative period for this intermediate risk procedure.     Further comments:  Resume supplements when OK with surgical team.  I would encourage incentive spirometry to assist with minimizing domitila-operative pulmonary risk.  DVT prophylaxis and timing of such per the discretion of the surgeon.     Further comments:  STOP-BANG screening for obstructive sleep apnea = 0, which indicates the patient is at low risk for having underlying FREDY.      ABIGAIL Stone  2021

## 2021-08-02 NOTE — H&P (VIEW-ONLY)
Preadmission Testing-  Pre-Operative H&P       Patient Name: Joshua Garcia  Referring Surgeon: Ted Blair    Reason for Referral: Pre-Operative Evaluation  Surgical Procedure:  Section Repeat  Operative Date: 21  Other Providers:      PCP: Lorne Fry MD        HISTORY OF PRESENT ILLNESS      Joshua Garcia is a 35 y.o. female presenting today to the [facility] at [facility] for pre-operative evaluation prior to planned surgery.    Patient reports positive regular fetal movement.  Denies leakage of fluid, bleeding, cramping, regular contractions.  Denies headaches, upper quadrant pain, nausea, vomiting, sudden increase in edema, facial edema, or vision changes. Plan for repeat .  Patient denies issues with anesthesia or surgery with c-sections in  and .        In regards to medical history:  Gestational diabetes - does not require insulin; monitors blood glucose at home  Anemia- also anemic prior to pregnancy; iron supplement daily  GERD- no medications; worse in pregnancies    The patient denies any current or recent chest pain or pressure, dyspnea, cough, sputum, fevers, chills, abdominal pain, nausea, vomiting, diarrhea or other symptoms.     Functionally, the patient is able to ascend a flight or so of stairs with no dyspnea or chest pain. She can walk at least three city blocks without chest pain or shortness of breath.. Functional capacity > 4 METS.    The patient denies, on specific questioning, the following:  No history of MI, valvular disease, arrhythmia,or CHF.  No history of FREDY.  No history of DVT/PE.  No history of COPD.  No history of CVA.  No history of DM. +history gestational diabetes  No history of CKD.   No history of liver disease or cirrhosis.  No history of bleeding disorder.  No history of difficult airway/difficult intubation.  No history of Malignant hyperthermia.  No history of  adverse reaction to anesthesia in the past.    PAST MEDICAL AND SURGICAL HISTORY      Past Medical History:   Diagnosis Date   • Anemia    • GDM (gestational diabetes mellitus)    • GERD (gastroesophageal reflux disease)     during pregnancy   • Wears glasses        Past Surgical History:   Procedure Laterality Date   •  SECTION  ; 2018       MEDICATIONS        Current Outpatient Medications:   •  ferrous sulfate 325 mg (65 mg iron) tablet, Take 65 mg by mouth 2 (two) times a day with meals., Disp: , Rfl:   •  prenatal vits96/iron fum/folic (PRE-DANAE VITAMIN) 27 mg iron- 800 mcg tablet, Take 1 tablet by mouth every morning.  , Disp: , Rfl:     ALLERGIES      No known allergies    FAMILY HISTORY      family history includes Diabetes in her biological father; Hyperlipidemia in her biological father; No Known Problems in her biological mother.    Denies any prior known family history of DVTs/PEs/clotting disorder    SOCIAL HISTORY      Social History     Tobacco Use   • Smoking status: Never Smoker   • Smokeless tobacco: Never Used   Substance Use Topics   • Alcohol use: No   • Drug use: No       REVIEW OF SYSTEMS      Constitution: Negative for decreased appetite, diaphoresis, fever, malaise/fatigue, weight gain and weight loss.   HENT: Negative for hearing loss.    Eyes: Negative for visual disturbance.   Cardiovascular: Negative for chest pain, dyspnea on exertion, irregular heartbeat,ear-syncope, orthopnea, palpitations, paroxysmal nocturnal dyspnea and syncope. +mild leg swelling  Respiratory: Negative for cough, hemoptysis, shortness of breath, sleep disturbances due to breathing and snoring.    Hematologic/Lymphatic: Does not bruise/bleed easily.   Skin: Negative for rash.   Musculoskeletal: Negative for arthritis and joint pain. Negative for myalgias.   Gastrointestinal: Negative for heartburn, hematemesis, hematochezia and melena.   Genitourinary: Negative for hematuria and nocturia.  "  Neurological: Negative for dizziness and light-headedness.   Psychiatric/Behavioral: Negative for altered mental status. The patient does not have insomnia.      All other systems reviewed and negative except as noted in HPI    PHYSICAL EXAMINATION      Visit Vitals  /62   Pulse 78   Temp 37.1 °C (98.7 °F) (Oral)   Resp 18   Ht 1.651 m (5' 5\")   Wt 96.4 kg (212 lb 8 oz)   LMP 2020   SpO2 100%   BMI 35.36 kg/m²     Body mass index is 35.36 kg/m².    Physical Exam  Cardiovascular:      Comments: Fetal heart rate by doppler = 140bpm  Abdominal:      Comments: +gravid   Genitourinary:     Comments: defer  Musculoskeletal:      Right lower le+ Edema present.      Left lower le+ Edema present.       Constitutional: Patient is oriented to person, place, and time. The patient appears well-developed and cooperative. No distress.   HENT:   Head: Normocephalic and atraumatic.   Mouth/Throat: Oropharynx is clear and moist.   Eyes: Pupils are equal, round, and reactive to light. No scleral icterus.   Neck: Normal range of motion. Neck supple.   Cardiovascular: Normal rate and regular rhythm.   No murmur heard.  Pulmonary/Chest: No accessory muscle usage. No tachypnea. No respiratory distress. No decreased breath sounds, wheezes, rhonchi, rales.  Abdominal: Soft. Bowel sounds are normal, exhibits no ascites, and no tenderness.   Neurological: Alert and oriented to person, place, and time.   Skin: Skin is warm, dry and intact.   Psychiatric: Has a normal mood and affect. Speech is normal and behavior is normal. Judgment and thought content normal. Cognition and memory are normal.   Nursing note and vitals reviewed.    LABS / EKG        Labs  Lab Results   Component Value Date    WBC 7.91 2018    HGB 8.4 (L) 2018    HCT 25.8 (L) 2018    MCV 85.4 2018     2018     No results found for: GLUCOSE, CALCIUM, NA, K, CO2, CL, BUN, CREATININE        ECG/Telemetry  Not " indicated    ASSESSMENT AND PLAN         Plan: Section Repeat  CBC, BMP, T&S  COVID-19 testing on admission     In regards to perioperative cardiac risk:  The patient denies any history of ischemic heart disease, denies any history of CHF, denies any history of CVA, is not on pre-operative treatment with insulin, and does not have a pre-operative creatinine > 2 mg/dL.   The Revised Cardiac Risk Index (RCRI) = 0 for this patient which indicates a 0.4% risk of major adverse cardiac event in the perioperative period for this intermediate risk procedure.     Further comments:  Resume supplements when OK with surgical team.  I would encourage incentive spirometry to assist with minimizing domitila-operative pulmonary risk.  DVT prophylaxis and timing of such per the discretion of the surgeon.     Further comments:  STOP-BANG screening for obstructive sleep apnea = 0, which indicates the patient is at low risk for having underlying FREDY.      ABIGAIL Stone  2021

## 2021-08-03 ENCOUNTER — ANESTHESIA EVENT (INPATIENT)
Dept: OBSTETRICS AND GYNECOLOGY | Facility: HOSPITAL | Age: 36
End: 2021-08-03
Payer: COMMERCIAL

## 2021-08-05 ENCOUNTER — DOCUMENTATION (OUTPATIENT)
Dept: PERINATAL CARE | Facility: HOSPITAL | Age: 36
End: 2021-08-05

## 2021-08-06 ENCOUNTER — HOSPITAL ENCOUNTER (INPATIENT)
Facility: HOSPITAL | Age: 36
LOS: 3 days | Discharge: HOME | End: 2021-08-09
Attending: OBSTETRICS & GYNECOLOGY | Admitting: OBSTETRICS & GYNECOLOGY
Payer: COMMERCIAL

## 2021-08-06 ENCOUNTER — ANESTHESIA (INPATIENT)
Dept: OBSTETRICS AND GYNECOLOGY | Facility: HOSPITAL | Age: 36
End: 2021-08-06
Payer: COMMERCIAL

## 2021-08-06 DIAGNOSIS — O34.211 MATERNAL CARE DUE TO LOW TRANSVERSE UTERINE SCAR FROM PREVIOUS CESAREAN DELIVERY: ICD-10-CM

## 2021-08-06 PROBLEM — Z98.891 PREVIOUS CESAREAN SECTION: Status: ACTIVE | Noted: 2021-08-06

## 2021-08-06 LAB
ABO + RH BLD: NORMAL
BLD GP AB SCN SERPL QL: NEGATIVE
CROSSMATCH: NORMAL
CROSSMATCH: NORMAL
D AG BLD QL: POSITIVE
ERYTHROCYTE [DISTWIDTH] IN BLOOD BY AUTOMATED COUNT: 15.3 % (ref 11.7–14.4)
GLUCOSE BLD-MCNC: 105 MG/DL (ref 70–99)
HCT VFR BLDCO AUTO: 30.4 % (ref 35–45)
HGB BLD-MCNC: 9.9 G/DL (ref 11.8–15.7)
ISBT CODE: 5100
ISBT CODE: 5100
LABORATORY COMMENT REPORT: NORMAL
MCH RBC QN AUTO: 27.7 PG (ref 28–33.2)
MCHC RBC AUTO-ENTMCNC: 32.6 G/DL (ref 32.2–35.5)
MCV RBC AUTO: 84.9 FL (ref 83–98)
PDW BLD AUTO: 11 FL (ref 9.4–12.3)
PLATELET # BLD AUTO: 186 K/UL (ref 150–369)
POCT TEST: ABNORMAL
PRODUCT CODE: NORMAL
PRODUCT CODE: NORMAL
PRODUCT STATUS: NORMAL
PRODUCT STATUS: NORMAL
RBC # BLD AUTO: 3.58 M/UL (ref 3.93–5.22)
SARS-COV-2 RNA RESP QL NAA+PROBE: NEGATIVE
SPECIMEN EXP DATE BLD: NORMAL
T PALLIDUM AB SER QL IF: NONREACTIVE
UNIT ABO: NORMAL
UNIT ABO: NORMAL
UNIT ID: NORMAL
UNIT ID: NORMAL
UNIT RH: POSITIVE
UNIT RH: POSITIVE
WBC # BLD AUTO: 5.62 K/UL (ref 3.8–10.5)

## 2021-08-06 PROCEDURE — 71000011 HC PACU PHASE 1 EA ADDL MIN: Performed by: OBSTETRICS & GYNECOLOGY

## 2021-08-06 PROCEDURE — 36415 COLL VENOUS BLD VENIPUNCTURE: CPT | Performed by: OBSTETRICS & GYNECOLOGY

## 2021-08-06 PROCEDURE — 25000000 HC PHARMACY GENERAL: Performed by: NURSE ANESTHETIST, CERTIFIED REGISTERED

## 2021-08-06 PROCEDURE — 37000010 HC ANESTHESIA SPINAL: Performed by: OBSTETRICS & GYNECOLOGY

## 2021-08-06 PROCEDURE — 63700000 HC SELF-ADMINISTRABLE DRUG: Performed by: OBSTETRICS & GYNECOLOGY

## 2021-08-06 PROCEDURE — 63600000 HC DRUGS/DETAIL CODE: Performed by: ANESTHESIOLOGY

## 2021-08-06 PROCEDURE — 86900 BLOOD TYPING SEROLOGIC ABO: CPT

## 2021-08-06 PROCEDURE — 25000000 HC PHARMACY GENERAL: Performed by: ANESTHESIOLOGY

## 2021-08-06 PROCEDURE — 88307 TISSUE EXAM BY PATHOLOGIST: CPT | Performed by: OBSTETRICS & GYNECOLOGY

## 2021-08-06 PROCEDURE — 72000022 HC C SECTION LEVEL 2: Performed by: OBSTETRICS & GYNECOLOGY

## 2021-08-06 PROCEDURE — 25800000 HC PHARMACY IV SOLUTIONS: Performed by: NURSE ANESTHETIST, CERTIFIED REGISTERED

## 2021-08-06 PROCEDURE — 71000001 HC PACU PHASE 1 INITIAL 30MIN: Performed by: OBSTETRICS & GYNECOLOGY

## 2021-08-06 PROCEDURE — 86920 COMPATIBILITY TEST SPIN: CPT

## 2021-08-06 PROCEDURE — 63600000 HC DRUGS/DETAIL CODE: Performed by: OBSTETRICS & GYNECOLOGY

## 2021-08-06 PROCEDURE — 85027 COMPLETE CBC AUTOMATED: CPT | Performed by: OBSTETRICS & GYNECOLOGY

## 2021-08-06 PROCEDURE — 27200121 HC CATH FOLEY

## 2021-08-06 PROCEDURE — 86780 TREPONEMA PALLIDUM: CPT | Performed by: OBSTETRICS & GYNECOLOGY

## 2021-08-06 PROCEDURE — 63600000 HC DRUGS/DETAIL CODE: Performed by: NURSE ANESTHETIST, CERTIFIED REGISTERED

## 2021-08-06 PROCEDURE — U0002 COVID-19 LAB TEST NON-CDC: HCPCS | Performed by: OBSTETRICS & GYNECOLOGY

## 2021-08-06 PROCEDURE — 12000000 HC ROOM AND CARE MED/SURG

## 2021-08-06 RX ORDER — SODIUM CITRATE AND CITRIC ACID MONOHYDRATE 334; 500 MG/5ML; MG/5ML
30 SOLUTION ORAL ONCE
Status: COMPLETED | OUTPATIENT
Start: 2021-08-06 | End: 2021-08-06

## 2021-08-06 RX ORDER — METHYLERGONOVINE MALEATE 0.2 MG/ML
200 INJECTION INTRAVENOUS ONCE AS NEEDED
Status: DISCONTINUED | OUTPATIENT
Start: 2021-08-06 | End: 2021-08-09 | Stop reason: HOSPADM

## 2021-08-06 RX ORDER — OXYTOCIN/0.9 % SODIUM CHLORIDE 40/1000ML
PLASTIC BAG, INJECTION (ML) INTRAVENOUS AS NEEDED
Status: DISCONTINUED | OUTPATIENT
Start: 2021-08-06 | End: 2021-08-06 | Stop reason: SURG

## 2021-08-06 RX ORDER — OXYTOCIN/0.9 % SODIUM CHLORIDE 40/1000ML
500 PLASTIC BAG, INJECTION (ML) INTRAVENOUS ONCE
Status: ACTIVE | OUTPATIENT
Start: 2021-08-06 | End: 2021-08-07

## 2021-08-06 RX ORDER — IBUPROFEN 200 MG
16-32 TABLET ORAL AS NEEDED
Status: DISCONTINUED | OUTPATIENT
Start: 2021-08-06 | End: 2021-08-09 | Stop reason: HOSPADM

## 2021-08-06 RX ORDER — DIPHENHYDRAMINE HCL 25 MG
25 CAPSULE ORAL EVERY 6 HOURS PRN
Status: DISCONTINUED | OUTPATIENT
Start: 2021-08-06 | End: 2021-08-09 | Stop reason: HOSPADM

## 2021-08-06 RX ORDER — SODIUM CHLORIDE, SODIUM LACTATE, POTASSIUM CHLORIDE, CALCIUM CHLORIDE 600; 310; 30; 20 MG/100ML; MG/100ML; MG/100ML; MG/100ML
80 INJECTION, SOLUTION INTRAVENOUS CONTINUOUS
Status: ACTIVE | OUTPATIENT
Start: 2021-08-06 | End: 2021-08-07

## 2021-08-06 RX ORDER — ONDANSETRON 4 MG/1
8 TABLET, ORALLY DISINTEGRATING ORAL ONCE
Status: COMPLETED | OUTPATIENT
Start: 2021-08-06 | End: 2021-08-06

## 2021-08-06 RX ORDER — ACETAMINOPHEN 325 MG/1
975 TABLET ORAL
Status: DISCONTINUED | OUTPATIENT
Start: 2021-08-06 | End: 2021-08-09 | Stop reason: HOSPADM

## 2021-08-06 RX ORDER — OXYTOCIN/0.9 % SODIUM CHLORIDE 40/1000ML
PLASTIC BAG, INJECTION (ML) INTRAVENOUS CONTINUOUS
Status: ACTIVE | OUTPATIENT
Start: 2021-08-06 | End: 2021-08-06

## 2021-08-06 RX ORDER — KETOROLAC TROMETHAMINE 30 MG/ML
INJECTION, SOLUTION INTRAMUSCULAR; INTRAVENOUS AS NEEDED
Status: DISCONTINUED | OUTPATIENT
Start: 2021-08-06 | End: 2021-08-06 | Stop reason: SURG

## 2021-08-06 RX ORDER — ALUMINUM HYDROXIDE, MAGNESIUM HYDROXIDE, AND SIMETHICONE 1200; 120; 1200 MG/30ML; MG/30ML; MG/30ML
30 SUSPENSION ORAL EVERY 4 HOURS PRN
Status: DISCONTINUED | OUTPATIENT
Start: 2021-08-06 | End: 2021-08-09 | Stop reason: HOSPADM

## 2021-08-06 RX ORDER — DIPHENHYDRAMINE HCL 50 MG/ML
25 VIAL (ML) INJECTION EVERY 6 HOURS PRN
Status: DISCONTINUED | OUTPATIENT
Start: 2021-08-06 | End: 2021-08-09 | Stop reason: HOSPADM

## 2021-08-06 RX ORDER — OXYTOCIN 10 [USP'U]/ML
10 INJECTION, SOLUTION INTRAMUSCULAR; INTRAVENOUS ONCE AS NEEDED
Status: DISCONTINUED | OUTPATIENT
Start: 2021-08-06 | End: 2021-08-09 | Stop reason: HOSPADM

## 2021-08-06 RX ORDER — ONDANSETRON HYDROCHLORIDE 2 MG/ML
4 INJECTION, SOLUTION INTRAVENOUS
Status: DISCONTINUED | OUTPATIENT
Start: 2021-08-06 | End: 2021-08-09 | Stop reason: HOSPADM

## 2021-08-06 RX ORDER — DEXTROSE 40 %
15-30 GEL (GRAM) ORAL AS NEEDED
Status: DISCONTINUED | OUTPATIENT
Start: 2021-08-06 | End: 2021-08-09 | Stop reason: HOSPADM

## 2021-08-06 RX ORDER — ACETAMINOPHEN 325 MG/1
975 TABLET ORAL ONCE
Status: COMPLETED | OUTPATIENT
Start: 2021-08-06 | End: 2021-08-06

## 2021-08-06 RX ORDER — ONDANSETRON HYDROCHLORIDE 2 MG/ML
4 INJECTION, SOLUTION INTRAVENOUS EVERY 6 HOURS PRN
Status: ACTIVE | OUTPATIENT
Start: 2021-08-06 | End: 2021-08-07

## 2021-08-06 RX ORDER — OXYCODONE HYDROCHLORIDE 5 MG/1
5-10 TABLET ORAL EVERY 4 HOURS PRN
Status: DISCONTINUED | OUTPATIENT
Start: 2021-08-06 | End: 2021-08-09 | Stop reason: HOSPADM

## 2021-08-06 RX ORDER — CALCIUM CARBONATE 200(500)MG
500 TABLET,CHEWABLE ORAL EVERY 4 HOURS PRN
Status: DISCONTINUED | OUTPATIENT
Start: 2021-08-06 | End: 2021-08-09 | Stop reason: HOSPADM

## 2021-08-06 RX ORDER — IBUPROFEN 600 MG/1
600 TABLET ORAL
Status: DISCONTINUED | OUTPATIENT
Start: 2021-08-08 | End: 2021-08-08

## 2021-08-06 RX ORDER — FENTANYL CITRATE 50 UG/ML
50 INJECTION, SOLUTION INTRAMUSCULAR; INTRAVENOUS
Status: DISCONTINUED | OUTPATIENT
Start: 2021-08-06 | End: 2021-08-09 | Stop reason: HOSPADM

## 2021-08-06 RX ORDER — NALOXONE HYDROCHLORIDE 0.4 MG/ML
0.1 INJECTION, SOLUTION INTRAMUSCULAR; INTRAVENOUS; SUBCUTANEOUS EVERY 6 HOURS PRN
Status: ACTIVE | OUTPATIENT
Start: 2021-08-06 | End: 2021-08-07

## 2021-08-06 RX ORDER — TRANEXAMIC ACID 10 MG/ML
1000 INJECTION, SOLUTION INTRAVENOUS ONCE AS NEEDED
Status: DISCONTINUED | OUTPATIENT
Start: 2021-08-06 | End: 2021-08-09 | Stop reason: HOSPADM

## 2021-08-06 RX ORDER — KETOROLAC TROMETHAMINE 30 MG/ML
30 INJECTION, SOLUTION INTRAMUSCULAR; INTRAVENOUS
Status: DISCONTINUED | OUTPATIENT
Start: 2021-08-06 | End: 2021-08-08

## 2021-08-06 RX ORDER — ONDANSETRON HYDROCHLORIDE 2 MG/ML
4 INJECTION, SOLUTION INTRAVENOUS EVERY 8 HOURS PRN
Status: DISCONTINUED | OUTPATIENT
Start: 2021-08-06 | End: 2021-08-09 | Stop reason: HOSPADM

## 2021-08-06 RX ORDER — SODIUM CHLORIDE, SODIUM LACTATE, POTASSIUM CHLORIDE, CALCIUM CHLORIDE 600; 310; 30; 20 MG/100ML; MG/100ML; MG/100ML; MG/100ML
150 INJECTION, SOLUTION INTRAVENOUS CONTINUOUS
Status: DISCONTINUED | OUTPATIENT
Start: 2021-08-06 | End: 2021-08-09 | Stop reason: HOSPADM

## 2021-08-06 RX ORDER — MISOPROSTOL 200 UG/1
1000 TABLET ORAL ONCE AS NEEDED
Status: DISCONTINUED | OUTPATIENT
Start: 2021-08-06 | End: 2021-08-09 | Stop reason: HOSPADM

## 2021-08-06 RX ORDER — ONDANSETRON HYDROCHLORIDE 2 MG/ML
INJECTION, SOLUTION INTRAVENOUS AS NEEDED
Status: DISCONTINUED | OUTPATIENT
Start: 2021-08-06 | End: 2021-08-06 | Stop reason: SURG

## 2021-08-06 RX ORDER — PROCHLORPERAZINE EDISYLATE 5 MG/ML
10 INJECTION INTRAMUSCULAR; INTRAVENOUS EVERY 6 HOURS PRN
Status: ACTIVE | OUTPATIENT
Start: 2021-08-06 | End: 2021-08-07

## 2021-08-06 RX ORDER — AMOXICILLIN 250 MG
1 CAPSULE ORAL 2 TIMES DAILY
Status: DISCONTINUED | OUTPATIENT
Start: 2021-08-06 | End: 2021-08-09 | Stop reason: HOSPADM

## 2021-08-06 RX ORDER — ACETAMINOPHEN 650 MG/1
650 SUPPOSITORY RECTAL ONCE
Status: COMPLETED | OUTPATIENT
Start: 2021-08-06 | End: 2021-08-06

## 2021-08-06 RX ORDER — DIPHENHYDRAMINE HCL 50 MG/ML
12.5 VIAL (ML) INJECTION EVERY 6 HOURS PRN
Status: DISPENSED | OUTPATIENT
Start: 2021-08-06 | End: 2021-08-07

## 2021-08-06 RX ORDER — CARBOPROST TROMETHAMINE 250 UG/ML
250 INJECTION, SOLUTION INTRAMUSCULAR ONCE AS NEEDED
Status: DISCONTINUED | OUTPATIENT
Start: 2021-08-06 | End: 2021-08-09 | Stop reason: HOSPADM

## 2021-08-06 RX ORDER — HYDROMORPHONE HYDROCHLORIDE 1 MG/ML
0.5 INJECTION, SOLUTION INTRAMUSCULAR; INTRAVENOUS; SUBCUTANEOUS
Status: DISCONTINUED | OUTPATIENT
Start: 2021-08-06 | End: 2021-08-09 | Stop reason: HOSPADM

## 2021-08-06 RX ORDER — BUPIVACAINE HYDROCHLORIDE 7.5 MG/ML
INJECTION, SOLUTION INTRASPINAL
Status: COMPLETED | OUTPATIENT
Start: 2021-08-06 | End: 2021-08-06

## 2021-08-06 RX ORDER — DEXTROSE 50 % IN WATER (D50W) INTRAVENOUS SYRINGE
25 AS NEEDED
Status: DISCONTINUED | OUTPATIENT
Start: 2021-08-06 | End: 2021-08-09 | Stop reason: HOSPADM

## 2021-08-06 RX ORDER — NALOXONE HYDROCHLORIDE 0.4 MG/ML
0.04 INJECTION, SOLUTION INTRAMUSCULAR; INTRAVENOUS; SUBCUTANEOUS AS NEEDED
Status: ACTIVE | OUTPATIENT
Start: 2021-08-06 | End: 2021-08-07

## 2021-08-06 RX ORDER — CEFAZOLIN SODIUM 2 G/50ML
2 SOLUTION INTRAVENOUS
Status: COMPLETED | OUTPATIENT
Start: 2021-08-06 | End: 2021-08-06

## 2021-08-06 RX ORDER — MORPHINE SULFATE 0.5 MG/ML
INJECTION, SOLUTION EPIDURAL; INTRATHECAL; INTRAVENOUS
Status: COMPLETED | OUTPATIENT
Start: 2021-08-06 | End: 2021-08-06

## 2021-08-06 RX ORDER — ONDANSETRON 4 MG/1
4 TABLET, ORALLY DISINTEGRATING ORAL EVERY 8 HOURS PRN
Status: DISCONTINUED | OUTPATIENT
Start: 2021-08-06 | End: 2021-08-09 | Stop reason: HOSPADM

## 2021-08-06 RX ORDER — FERROUS SULFATE 325(65) MG
325 TABLET ORAL 2 TIMES DAILY WITH MEALS
Status: DISCONTINUED | OUTPATIENT
Start: 2021-08-07 | End: 2021-08-09 | Stop reason: HOSPADM

## 2021-08-06 RX ADMIN — SODIUM CHLORIDE, SODIUM LACTATE, POTASSIUM CHLORIDE, CALCIUM CHLORIDE 150 ML/HR: 600; 310; 30; 20 INJECTION, SOLUTION INTRAVENOUS at 08:07

## 2021-08-06 RX ADMIN — PRENATAL VITAMINS-IRON FUMARATE 27 MG IRON-FOLIC ACID 0.8 MG TABLET 1 TABLET: at 16:48

## 2021-08-06 RX ADMIN — ONDANSETRON HYDROCHLORIDE 4 MG: 2 INJECTION, SOLUTION INTRAMUSCULAR; INTRAVENOUS at 10:34

## 2021-08-06 RX ADMIN — SODIUM CHLORIDE, SODIUM LACTATE, POTASSIUM CHLORIDE, CALCIUM CHLORIDE: 600; 310; 30; 20 INJECTION, SOLUTION INTRAVENOUS at 09:50

## 2021-08-06 RX ADMIN — DOCUSATE SODIUM AND SENNOSIDES 1 TABLET: 8.6; 5 TABLET, FILM COATED ORAL at 20:05

## 2021-08-06 RX ADMIN — ONDANSETRON 8 MG: 4 TABLET, ORALLY DISINTEGRATING ORAL at 08:09

## 2021-08-06 RX ADMIN — KETOROLAC TROMETHAMINE 30 MG: 30 INJECTION, SOLUTION INTRAMUSCULAR; INTRAVENOUS at 23:07

## 2021-08-06 RX ADMIN — DIPHENHYDRAMINE HYDROCHLORIDE 25 MG: 50 INJECTION INTRAMUSCULAR; INTRAVENOUS at 20:04

## 2021-08-06 RX ADMIN — Medication 200 ML: at 10:57

## 2021-08-06 RX ADMIN — MORPHINE SULFATE 1.5 MG: 0.5 INJECTION, SOLUTION EPIDURAL; INTRATHECAL; INTRAVENOUS at 09:50

## 2021-08-06 RX ADMIN — KETOROLAC TROMETHAMINE 30 MG: 30 INJECTION, SOLUTION INTRAMUSCULAR; INTRAVENOUS at 16:48

## 2021-08-06 RX ADMIN — BUPIVACAINE HYDROCHLORIDE IN DEXTROSE 2 ML: 7.5 INJECTION, SOLUTION SUBARACHNOID at 09:50

## 2021-08-06 RX ADMIN — CEFAZOLIN 2 G: 330 INJECTION, POWDER, FOR SOLUTION INTRAMUSCULAR; INTRAVENOUS at 09:36

## 2021-08-06 RX ADMIN — FENTANYL CITRATE 50 MCG: 50 INJECTION, SOLUTION INTRAMUSCULAR; INTRAVENOUS at 11:36

## 2021-08-06 RX ADMIN — ACETAMINOPHEN 975 MG: 325 TABLET ORAL at 14:12

## 2021-08-06 RX ADMIN — PHENYLEPHRINE HYDROCHLORIDE 50 MCG/MIN: 10 INJECTION INTRAVENOUS at 09:53

## 2021-08-06 RX ADMIN — ACETAMINOPHEN 975 MG: 325 TABLET ORAL at 08:10

## 2021-08-06 RX ADMIN — FENTANYL CITRATE 50 MCG: 50 INJECTION, SOLUTION INTRAMUSCULAR; INTRAVENOUS at 12:47

## 2021-08-06 RX ADMIN — ACETAMINOPHEN 975 MG: 325 TABLET ORAL at 20:05

## 2021-08-06 RX ADMIN — SODIUM CITRATE AND CITRIC ACID MONOHYDRATE 30 ML: 500; 334 SOLUTION ORAL at 08:10

## 2021-08-06 RX ADMIN — KETOROLAC TROMETHAMINE 30 MG: 30 INJECTION, SOLUTION INTRAMUSCULAR at 10:59

## 2021-08-06 RX ADMIN — Medication 500 ML: at 10:13

## 2021-08-06 RX ADMIN — DIPHENHYDRAMINE HYDROCHLORIDE 12.5 MG: 50 INJECTION INTRAMUSCULAR; INTRAVENOUS at 13:36

## 2021-08-06 ASSESSMENT — COGNITIVE AND FUNCTIONAL STATUS - GENERAL: DO YOU HAVE SERIOUS DIFFICULTY WALKING OR CLIMBING STAIRS: NO

## 2021-08-06 ASSESSMENT — PATIENT HEALTH QUESTIONNAIRE - PHQ9: SUM OF ALL RESPONSES TO PHQ9 QUESTIONS 1 & 2: 0

## 2021-08-06 NOTE — ANESTHESIA POSTPROCEDURE EVALUATION
Patient: Joshua Garcia    Procedure Summary     Date: 21 Room / Location:  L&D 1 / [facility] L&D OR    Anesthesia Start: 934 Anesthesia Stop:     Procedure:  SECTION repeat (N/A Abdomen) Diagnosis:       Maternal care due to low transverse uterine scar from previous  delivery      (csection)    Surgeons: Ted Blair MD Responsible Provider: Myla Huizar MD    Anesthesia Type: spinal ASA Status: 2          Anesthesia Type: spinal  PACU Vitals    No data found in the last 10 encounters.     There were no vitals filed for this visit.      There were no vitals filed for this visit.    Anesthesia Post Evaluation    Pain management: adequate  Patient location during evaluation: PACU  Patient participation: complete - patient participated  Level of consciousness: awake and alert  Cardiovascular status: acceptable  Airway Patency: adequate  Respiratory status: acceptable  Hydration status: acceptable  Anesthetic complications: no

## 2021-08-06 NOTE — ANESTHESIA PREPROCEDURE EVALUATION
Anesthesia ROS/MED HX    Anesthesia History    Gestational diabetes   Previous anesthetics  No history of anesthetic complications  Pulmonary - neg  Neuro/Psych - neg  Hematological    anemia  GI/Hepatic   GERD  Musculoskeletal- neg  Renal Disease- neg  Endo/Other   Diabetes  Body Habitus: Obese      Relevant Problems   No relevant active problems       Physical Exam    Airway   Mallampati: III   TM distance: >3 FB   Neck ROM: full  Cardiovascular - normal   Rhythm: regular   Rate: normalPulmonary - normal   clear to auscultation  Dental - normal        Anesthesia Plan    Plan: spinal    Technique: spinal     Lines and Monitors: PIV     Airway: natural airway / supplemental oxygen   ASA 2  Anesthetic plan and risks discussed with: patient  Induction:    intravenous   Postop Plan:   Patient Disposition: inpatient floor planned admission   Pain Management: epidural

## 2021-08-06 NOTE — H&P
HPI     Joshua Garcia is a 35 y.o. female  at 39w2d with an estimated due date of 2021, by Last Menstrual Period who presents for elective repeat  section.  PNC has been with [facility] and uncomplicated.  She denies contractions, leakage of fluid, vaginal bleeding.  + Fetal movement.      Pregnancy complicated by:  1. H/o  section x 2  2. Anemia  3. Obesity, BMI=36  4. AMA - normal NIPT; XX  5. GD<A1    OB History:   OB History    Para Term  AB Living   4 2 2 0 1 2   SAB TAB Ectopic Multiple Live Births   1 0 0 0 2      # Outcome Date GA Lbr Spencer/2nd Weight Sex Delivery Anes PTL Lv   4 Current            3 Term 06/15/18 43w2d   M CS-LTranv Spinal  BHASKAR      Name: RIMA,BOY      Apgar1: 9  Apgar5: 9   2 Term 10/26/15 41w0d  3.969 kg (8 lb 12 oz) M CS-LTranv EPI N BHASKAR      Birth Comments: IOL-PD, fetal tachycardia 3cm, MSAF   1 SAB 13               Medical History:   Past Medical History:   Diagnosis Date   • Anemia    • GDM (gestational diabetes mellitus)    • GERD (gastroesophageal reflux disease)     during pregnancy   • Wears glasses        Surgical History:   Past Surgical History:   Procedure Laterality Date   • CERVICAL BIOPSY  W/ LOOP ELECTRODE EXCISION     •  SECTION  ;    • DILATION AND EVACUATION         Social History:   Social History     Socioeconomic History   • Marital status:      Spouse name:    • Number of children: 2   • Years of education: None   • Highest education level: None   Occupational History   • None   Tobacco Use   • Smoking status: Never Smoker   • Smokeless tobacco: Never Used   Substance and Sexual Activity   • Alcohol use: No   • Drug use: No   • Sexual activity: Defer   Other Topics Concern   • None   Social History Narrative   • None     Social Determinants of Health     Financial Resource Strain:    • Difficulty of Paying Living Expenses:    Food Insecurity:    • Worried About Running Out of  Food in the Last Year:    • Ran Out of Food in the Last Year:    Transportation Needs:    • Lack of Transportation (Medical):    • Lack of Transportation (Non-Medical):    Physical Activity:    • Days of Exercise per Week:    • Minutes of Exercise per Session:    Stress:    • Feeling of Stress :    Social Connections:    • Frequency of Communication with Friends and Family:    • Frequency of Social Gatherings with Friends and Family:    • Attends Anabaptism Services:    • Active Member of Clubs or Organizations:    • Attends Club or Organization Meetings:    • Marital Status:    Intimate Partner Violence:    • Fear of Current or Ex-Partner:    • Emotionally Abused:    • Physically Abused:    • Sexually Abused:         Family History:   Family History   Problem Relation Age of Onset   • No Known Problems Biological Mother    • Diabetes Biological Father    • Hyperlipidemia Biological Father        Allergies: No known allergies    Prior to Admission medications    Medication Sig Start Date End Date Taking? Authorizing Provider   ferrous sulfate 325 mg (65 mg iron) tablet Take 65 mg by mouth 2 (two) times a day with meals.    ProviderClarence MD   prenatal vits96/iron fum/folic (PRE-DANAE VITAMIN) 27 mg iron- 800 mcg tablet Take 1 tablet by mouth every morning.      ProviderClarence MD         Objective     Vital Signs for the last 24 hours:       Fetal Monitoring:  FHR Baseline: 140  FHR Variability: moderate  FHR Accelerations: present  FHR Decelerations: absent    Contraction Frequency: irregular    Exam:  General Appearance: Alert, cooperative, no acute distress  Lungs: respirations unlabored  Heart: Regular rate and rhythm  Abdomen: gravid, nontender; Pfann scar  Genitalia: deferred  Extremities: no edema or calf tenderness  Neurologic: grossly intact without focal deficits        Labs:  ABO   Date Value Ref Range Status   2021 O  Final     Rh Factor   Date Value Ref Range Status   2021  Positive  Final     Rubella IgG Scr   Date Value Ref Range Status   2021 IMMUNE  Final     Strep Gp B Cult/DNA Probe   Date Value Ref Range Status   07/15/2021 No Group B Streptococcus Isolated See table below, No growth at 18-24 hours, Probable contaminants, suggest recollection, No growth at 48 hours, No growth at 72 hours, No growth at 96 hours, No growth at 120 hours, No growth at 1 week, No growth at 2 weeks, No growth at 3 weeks, No gr... Final       Assessment/Plan     Joshua Garcia is a 35 y.o. female  at 39w2d admitted for elective repeat  section.    FHR: Category I  GBS: negative     1. Admit for repeat C/S, not interested in tubal sterilization  2. Anemia - will prepare 2U prbcs  3. GDMA1    Ted Blair MD  2021

## 2021-08-06 NOTE — OP NOTE
OB  Delivery OP Note    Date of Procedure: 2021  Patient:Joshua Garcia  :1985    Procedure:     SECTION repeat  CPT(R) Code:  97675 - [place]  DELIVERY ONLY     Review the Delivery Report for details.      Details    Pre-Op Diagnosis: 1. 35 y.o.  Intrauterine pregnancy at 39w2d  2. Previous  section x 2  3. GDMA1  4. AMA  5. Obesity  6. Anemia   Post-Op Diagnosis: 1. 35 y.o.  pregnancy at 39w2d - delivered  2. Previous  section x 2  3. GDMA1  4. AMA  5. Obesity  6. Anemia      Procedure: 1. Repeat  , Low Transverse  via low transverse uterine incision and Pfannenstiel skin incision.   Anesthesia: Spinal    Findings: Normal uterus, tubes, and ovaries; Few thin omental adhesions to anterior abdominal wall   EBL  700 mL   Complications: None     Specimen Information:  ID Type Source Tests Collected by Time Destination   1 : placenta Placenta Placenta PATHOLOGY TISSUE EXAM Ted Blair MD 2021 1020      Drains: Vargas draining clear    Staff:  Surgeon(s):  Ted Blair MD  Anesthesiologist: Myla Huizar MD  CRNA: Marlene Mccann CRNA   Circulator: Milan Ramos RN  Nurse Practitioner: Murphy Love NP  Scrub Person: Kimberly Thompson  Baby Nurse: Uvaldo Brown RN  Other Staff:  JOMAR LAZARO;MILAN RAMOS;UVALDO BROWN;MURPHY LOVE  Delivery Assist;Delivery Nurse;Nursery Nurse;Neonatologist     INFANT INFORMATION  Time of Birth:10:11 AM   Presentation: Vertex   Position:Left ,Occiput ,Anterior ,   Cord: 3 vessels ,Complications:None   [place] Sex: female   [place] Weight: 3.39 kg (7 lb 7.6 oz)      1 Minute 5 Minute 10 Minute   Apgar Totals: 9   9          Information for the patient's :  Jose, Girl [[id number]]     [place] Cord Gas     None           Procedure:  The patient was taken to the operating room and after achievement of adequate spinal anesthesia, was placed in the supine  position with a left lateral tilt.  A Vargas catheter was inserted under a Betadine prep.  The abdomen was prepped with Chloraprep and after several minutes of drying, was draped in the usual sterile fashion.  A 'time-Out' was performed.      Once the level of anesthetic was found to be satisfactory with an Allis test, a Pfannenstiel skin incision was made with the scalpel through the previous Pfannenstiel scar, and carried through to the underlying layer of fascia with the knife and the Bovie electrocautery.  The fascia was incised in the midline and the incision was extended laterally with ford scissors.  The rectus muscles were dissected off of the fascia superiorly and inferiorly with the Ford scissors. The rectus muscles were  in the midline and the peritoneum was tented with two hemostats and entered with the Metzenbaum scissor.  The peritoneal incision was bluntly extended superiorly and inferiorly with excellent visualization of the bladder.  A bladder blade was placed.  The vesicouterine peritoneum was identified and grasped with smooth pickups and incised sharply with Metzenbaum scissors, and a bladder flap was created using sharp and blunt dissection. The bladder blade was advanced.  The knife was used in the lower uterine segment, and carried down to the level of the amniotic sac.  Clear fluid was received.  The incision was extended bluntly in a transverse fashion.   All instruments were removed.      The infant's head was palpated, grasped and elevated to the incision.  With gentle fundal pressure the head was delivered. Shoulders and body then delivered without difficulty.  After a 45 second delay the cord was clamped and cut and infant was handed to waiting pediatric team, ABIGAIL Salas who was present for delivery.  Cord blood was collected.  Placenta was expressed, then delivered intact.  The uterus was exteriorized and cleared of all clots and debris with dry laps.  The hysterotomy was  closed with 0 vicryl suture in a running interlocking fashion.  A second layer of the same suture type was utilized in an imbricating fashion incorporating the bladder flap edge, to obtain excellent hemostasis.      The uterus was returned to the abdomen and the gutters were cleared of all clots and debris.  Small omental adhesions to the anterior abdominal wall were coagulated and divided with the Bovie and ligated with 0 Chromic suture.  The pelvis was swept and was clear of any foreign objects.  The hysterotomy was inspected and noted to be without active bleeding.  The pelvis was irrigated.  To further assure hemostasis, SurgiCel powder was applied to the hysterotomy site.   The parietal peritoneum was grasped with Nurys hemostats, and closed in a running fashion with 0 Chromic suture.  The underside of the fascia and the rectus muscles were carefully inspected and rendered hemostatic with Bovie and SurgiCel powder.  The fascia was reapproximated using 0 Vicryl suture in a running fashion from angle to midline bilaterally.  Small bleeders in the subcutaneous tissue were coagulated with the Bovie.  The subcutaneous tissue was then reapproximated using 2/0 plain suture.  The skin was reapproximated using 4/0 Monocryl in a subcuticular fashion.  Dermabond skin glue was applied to the incision site and once dry, a Telfa, ABD and Tegaderm were applied.    The patient tolerated the procedure well and was transported to the recovery room in stable condition.   Infant was stable and went to the recovery room.    The sponge, instrument, and needle counts were correct.      Attending Attestation: I performed the procedure.    Ted Blair MD

## 2021-08-06 NOTE — OR SURGEON
Pre-Procedure patient identification:  I am the primary operating surgeon/proceduralist and I have identified the patient on 08/06/21 at 7:43 AM Ted Blair MD  Phone Number: [phone]

## 2021-08-06 NOTE — ANESTHESIA PROCEDURE NOTES
Spinal Block    Patient location during procedure: OB  Staffing  Performed: anesthesiologist   Anesthesiologist: Myla Huizar MD  Other anesthesia staff: Marlene Mccann CRNA  Reason for block: primary anesthetic  Preanesthetic Checklist  Completed: patient identified, surgical consent, pre-op evaluation, timeout performed, IV checked, risks and benefits discussed, monitors and equipment checked and sterile field maintained during procedure  Spinal Block  Patient position: sitting  Prep: ChloraPrep and site prepped and draped  Patient monitoring: heart rate, cardiac monitor, continuous pulse ox and blood pressure  Approach: midline  Location: L3-4  Injection technique: single-shot  Needle  Needle type: Sprotte   Needle gauge: 24 G  Needle length: 3.5 in  Assessment  Sensory level: T4  Events: cerebrospinal fluid  Additional Notes  Procedure well tolerated. Vital signs stable.    Medications Administered - 8/6/2021 9:50 AM   Bupivacaine (PF) (MARCAINE SPINAL) 0.75 % (7.5 mg/mL) injection, 2 mL  morphine PF (DURAMORPH) injection, 1.5 mg

## 2021-08-06 NOTE — ANESTHESIOLOGIST PRE-PROCEDURE ATTESTATION
Pre-Procedure Patient Identification:  I am the Primary Anesthesiologist and have identified the patient on 08/06/21 at 09:24 AM.   I have confirmed the procedure(s) will be performed by the following surgeon/proceduralist Ted Blair MD.

## 2021-08-06 NOTE — PLAN OF CARE
Problem: Adult Inpatient Plan of Care  Goal: Plan of Care Review  Outcome: Progressing  Goal: Patient-Specific Goal (Individualized)  Outcome: Progressing  Goal: Absence of Hospital-Acquired Illness or Injury  Outcome: Progressing  Goal: Optimal Comfort and Wellbeing  Outcome: Progressing  Goal: Readiness for Transition of Care  Outcome: Progressing     Problem:  Fall Injury Risk  Goal: Absence of Fall, Infant Drop and Related Injury  Outcome: Progressing     Problem: Pain (Postpartum  Delivery)  Goal: Acceptable Pain Control  Outcome: Progressing     Problem: Postoperative Nausea and Vomiting (Postpartum  Delivery)  Goal: Nausea and Vomiting Relief  Outcome: Progressing     Problem: Postoperative Urinary Retention (Postpartum  Delivery)  Goal: Effective Urinary Elimination  Outcome: Progressing        Silvaan is recovering well from Repeat C/S this am of a healthy Baby Girl. Bleeding WDL and pain controlled with around-the-clock scheduling of Tylenol & Toradol. Breastfeeding on demand. Offered to ambulate but she would like to wait until this evening. Will cont to monitor.

## 2021-08-07 LAB
ERYTHROCYTE [DISTWIDTH] IN BLOOD BY AUTOMATED COUNT: 15.8 % (ref 11.7–14.4)
HCT VFR BLDCO AUTO: 30.5 % (ref 35–45)
HGB BLD-MCNC: 9.9 G/DL (ref 11.8–15.7)
MCH RBC QN AUTO: 28.2 PG (ref 28–33.2)
MCHC RBC AUTO-ENTMCNC: 32.5 G/DL (ref 32.2–35.5)
MCV RBC AUTO: 86.9 FL (ref 83–98)
PDW BLD AUTO: 11.5 FL (ref 9.4–12.3)
PLATELET # BLD AUTO: 177 K/UL (ref 150–369)
RBC # BLD AUTO: 3.51 M/UL (ref 3.93–5.22)
WBC # BLD AUTO: 6.97 K/UL (ref 3.8–10.5)

## 2021-08-07 PROCEDURE — 63700000 HC SELF-ADMINISTRABLE DRUG: Performed by: OBSTETRICS & GYNECOLOGY

## 2021-08-07 PROCEDURE — 85027 COMPLETE CBC AUTOMATED: CPT | Performed by: OBSTETRICS & GYNECOLOGY

## 2021-08-07 PROCEDURE — 12000000 HC ROOM AND CARE MED/SURG

## 2021-08-07 PROCEDURE — 63600000 HC DRUGS/DETAIL CODE: Performed by: OBSTETRICS & GYNECOLOGY

## 2021-08-07 PROCEDURE — 36415 COLL VENOUS BLD VENIPUNCTURE: CPT | Performed by: OBSTETRICS & GYNECOLOGY

## 2021-08-07 RX ORDER — FERROUS SULFATE 325(65) MG
325 TABLET ORAL 2 TIMES DAILY WITH MEALS
Status: DISCONTINUED | OUTPATIENT
Start: 2021-08-07 | End: 2021-08-09 | Stop reason: HOSPADM

## 2021-08-07 RX ADMIN — ACETAMINOPHEN 975 MG: 325 TABLET ORAL at 14:23

## 2021-08-07 RX ADMIN — ACETAMINOPHEN 975 MG: 325 TABLET ORAL at 20:57

## 2021-08-07 RX ADMIN — DOCUSATE SODIUM AND SENNOSIDES 1 TABLET: 8.6; 5 TABLET, FILM COATED ORAL at 09:27

## 2021-08-07 RX ADMIN — KETOROLAC TROMETHAMINE 30 MG: 30 INJECTION, SOLUTION INTRAMUSCULAR; INTRAVENOUS at 23:00

## 2021-08-07 RX ADMIN — ACETAMINOPHEN 975 MG: 325 TABLET ORAL at 09:27

## 2021-08-07 RX ADMIN — KETOROLAC TROMETHAMINE 30 MG: 30 INJECTION, SOLUTION INTRAMUSCULAR; INTRAVENOUS at 05:11

## 2021-08-07 RX ADMIN — PRENATAL VITAMINS-IRON FUMARATE 27 MG IRON-FOLIC ACID 0.8 MG TABLET 1 TABLET: at 09:27

## 2021-08-07 RX ADMIN — ACETAMINOPHEN 975 MG: 325 TABLET ORAL at 02:14

## 2021-08-07 RX ADMIN — KETOROLAC TROMETHAMINE 30 MG: 30 INJECTION, SOLUTION INTRAMUSCULAR; INTRAVENOUS at 18:16

## 2021-08-07 RX ADMIN — FERROUS SULFATE TAB 325 MG (65 MG ELEMENTAL FE) 325 MG: 325 (65 FE) TAB at 09:27

## 2021-08-07 RX ADMIN — FERROUS SULFATE TAB 325 MG (65 MG ELEMENTAL FE) 325 MG: 325 (65 FE) TAB at 18:15

## 2021-08-07 RX ADMIN — KETOROLAC TROMETHAMINE 30 MG: 30 INJECTION, SOLUTION INTRAMUSCULAR; INTRAVENOUS at 11:14

## 2021-08-07 RX ADMIN — DIPHENHYDRAMINE HYDROCHLORIDE 25 MG: 25 CAPSULE ORAL at 02:14

## 2021-08-07 NOTE — PLAN OF CARE
Problem: Adult Inpatient Plan of Care  Goal: Plan of Care Review  Outcome: Progressing    Pt comfortable with toradol and tylenol through the night. Pt is both breast and formula feeding independently. Latch verified by RN. Pt due to void by 1130  post gutiérrez removal. Pt up in chair at this time.

## 2021-08-07 NOTE — PROGRESS NOTES
Obstetrics Postpartum Progress Note    Events  No acute events overnight.    No N/V/CP/SOB  C/o itching, rec'd Benadryl    Subjective  Pain: well controlled with current meds  Bleeding: lochia moderate  Diet: taking regular diet  Voiding: gutiérrez discontinued, awaiting spontaneous void  Ambulating: as tolerated  Feeding: plans to breastfeed    Vitals  Temp:  [36.4 °C (97.5 °F)-36.8 °C (98.3 °F)] 36.4 °C (97.5 °F)  Heart Rate:  [60-97] 68  Resp:  [12-26] 16  BP: ()/(48-65) 107/65      Physical Exam  General: well  Heart: Regular rate and rhythm  Lungs: Clear to auscultation bilaterally  Abdomen: soft, nontender, positive bowel sounds, +gaseouds distention;                     nontender reducible umbilical hernia  Fundus: firm and at umbilicus appropriately tender  Incision: C/D/I with subQ sutures, and skin sealed with Dermabond glue  Extremities: no calf tenderness or edema    Labs  Labs Reviewed:  Lab Results   Component Value Date    ABO O 2021    LABRH Positive 2021      Rubella: immune    Lab Results   Component Value Date    HGB 9.9 (L) 2021         Assessment/Plan   Problem-based Assessment and Plan    Joshua Garcia is a 35 y.o.  postop day 1 s/p repeat  , Low Transverse  - stable.      1. Routine postpartum care  2. Female infant, breast feeding, no issues  3. Vital Signs: stable  4. Hemodynamics: anemia without signs of hemodynamic instability; Fe supps  5. Pain: controlled  6. VTE Assessment: Early Ambulation  7. Vaccinations/Rhogam: n/a   8. Post care: meeting all goals   9. H/o GDMA1 - for 75gram 2hr  GTT 12wks pp  11. Anticipate discharge home on POD#3      Ted Blair MD

## 2021-08-07 NOTE — PLAN OF CARE
Problem: Adult Inpatient Plan of Care  Goal: Plan of Care Review  Outcome: Progressing  Goal: Patient-Specific Goal (Individualized)  Outcome: Progressing  Goal: Absence of Hospital-Acquired Illness or Injury  Outcome: Progressing  Goal: Optimal Comfort and Wellbeing  Outcome: Progressing  Goal: Readiness for Transition of Care  Outcome: Progressing     Problem:  Fall Injury Risk  Goal: Absence of Fall, Infant Drop and Related Injury  Outcome: Progressing     Problem: Pain (Postpartum  Delivery)  Goal: Acceptable Pain Control  Outcome: Progressing     Problem: Postoperative Nausea and Vomiting (Postpartum  Delivery)  Goal: Nausea and Vomiting Relief  Outcome: Progressing     Problem: Postoperative Urinary Retention (Postpartum  Delivery)  Goal: Effective Urinary Elimination  Outcome: Progressing

## 2021-08-08 PROCEDURE — 63600000 HC DRUGS/DETAIL CODE: Performed by: OBSTETRICS & GYNECOLOGY

## 2021-08-08 PROCEDURE — 200200 PR NO CHARGE: Performed by: OBSTETRICS & GYNECOLOGY

## 2021-08-08 PROCEDURE — 12000000 HC ROOM AND CARE MED/SURG

## 2021-08-08 PROCEDURE — 63700000 HC SELF-ADMINISTRABLE DRUG: Performed by: OBSTETRICS & GYNECOLOGY

## 2021-08-08 RX ORDER — IBUPROFEN 600 MG/1
600 TABLET ORAL
Status: DISCONTINUED | OUTPATIENT
Start: 2021-08-08 | End: 2021-08-09 | Stop reason: HOSPADM

## 2021-08-08 RX ADMIN — ACETAMINOPHEN 975 MG: 325 TABLET ORAL at 16:33

## 2021-08-08 RX ADMIN — ACETAMINOPHEN 975 MG: 325 TABLET ORAL at 08:48

## 2021-08-08 RX ADMIN — KETOROLAC TROMETHAMINE 30 MG: 30 INJECTION, SOLUTION INTRAMUSCULAR; INTRAVENOUS at 12:36

## 2021-08-08 RX ADMIN — KETOROLAC TROMETHAMINE 30 MG: 30 INJECTION, SOLUTION INTRAMUSCULAR; INTRAVENOUS at 05:12

## 2021-08-08 RX ADMIN — ACETAMINOPHEN 975 MG: 325 TABLET ORAL at 02:33

## 2021-08-08 RX ADMIN — IBUPROFEN 600 MG: 600 TABLET ORAL at 19:39

## 2021-08-08 RX ADMIN — ACETAMINOPHEN 975 MG: 325 TABLET ORAL at 22:41

## 2021-08-08 RX ADMIN — FERROUS SULFATE TAB 325 MG (65 MG ELEMENTAL FE) 325 MG: 325 (65 FE) TAB at 16:32

## 2021-08-08 RX ADMIN — PRENATAL VITAMINS-IRON FUMARATE 27 MG IRON-FOLIC ACID 0.8 MG TABLET 1 TABLET: at 08:48

## 2021-08-08 RX ADMIN — FERROUS SULFATE TAB 325 MG (65 MG ELEMENTAL FE) 325 MG: 325 (65 FE) TAB at 08:48

## 2021-08-09 VITALS
DIASTOLIC BLOOD PRESSURE: 57 MMHG | BODY MASS INDEX: 35.32 KG/M2 | RESPIRATION RATE: 16 BRPM | HEART RATE: 74 BPM | SYSTOLIC BLOOD PRESSURE: 113 MMHG | OXYGEN SATURATION: 97 % | TEMPERATURE: 98 F | HEIGHT: 65 IN | WEIGHT: 212 LBS

## 2021-08-09 LAB
CASE RPRT: NORMAL
CLINICAL INFO: NORMAL
PATH REPORT.FINAL DX SPEC: NORMAL
PATH REPORT.GROSS SPEC: NORMAL

## 2021-08-09 PROCEDURE — 63700000 HC SELF-ADMINISTRABLE DRUG: Performed by: OBSTETRICS & GYNECOLOGY

## 2021-08-09 RX ORDER — IBUPROFEN 600 MG/1
600 TABLET ORAL
Qty: 40 TABLET | Refills: 0 | Status: SHIPPED | OUTPATIENT
Start: 2021-08-09 | End: 2021-08-19

## 2021-08-09 RX ORDER — AMOXICILLIN 250 MG
1 CAPSULE ORAL 2 TIMES DAILY
Qty: 60 TABLET | Refills: 0 | Status: SHIPPED | OUTPATIENT
Start: 2021-08-09 | End: 2021-09-08

## 2021-08-09 RX ORDER — FERROUS SULFATE 325(65) MG
325 TABLET ORAL 2 TIMES DAILY WITH MEALS
Qty: 60 TABLET | Refills: 0 | Status: SHIPPED | OUTPATIENT
Start: 2021-08-09 | End: 2021-09-08

## 2021-08-09 RX ORDER — OXYCODONE HYDROCHLORIDE 5 MG/1
5-10 TABLET ORAL EVERY 4 HOURS PRN
Qty: 15 TABLET | Refills: 0 | Status: SHIPPED | OUTPATIENT
Start: 2021-08-09 | End: 2021-08-14

## 2021-08-09 RX ADMIN — IBUPROFEN 600 MG: 600 TABLET ORAL at 07:30

## 2021-08-09 RX ADMIN — PRENATAL VITAMINS-IRON FUMARATE 27 MG IRON-FOLIC ACID 0.8 MG TABLET 1 TABLET: at 07:30

## 2021-08-09 RX ADMIN — ACETAMINOPHEN 975 MG: 325 TABLET ORAL at 07:31

## 2021-08-09 RX ADMIN — ACETAMINOPHEN 975 MG: 325 TABLET ORAL at 04:32

## 2021-08-09 RX ADMIN — IBUPROFEN 600 MG: 600 TABLET ORAL at 01:30

## 2021-08-09 RX ADMIN — FERROUS SULFATE TAB 325 MG (65 MG ELEMENTAL FE) 325 MG: 325 (65 FE) TAB at 07:30

## 2021-08-09 NOTE — PROGRESS NOTES
Obstetrics Postpartum Progress Note    Events  No acute events overnight.    Subjective  Pain: no  Bleeding: lochia minimal  Diet: taking regular diet  Voiding: without difficulty  Bowel: passing flatus positive bowel movement  Ambulating: as tolerated    Vitals  Temp:  [36.7 °C (98 °F)-37 °C (98.6 °F)] 36.7 °C (98 °F)  Heart Rate:  [70-75] 74  Resp:  [16-18] 16  BP: (111-125)/(57-60) 113/57    I&O  No intake or output data in the 24 hours ending 21 0908    Physical Exam  General: well  Heart: Regular rate and rhythm  Lungs: Clear to auscultation bilaterally  Abdomen: soft, nondistended, non-tender  Fundus: firm and below umbilicus  Incision: incision c/d/i with subQ sutures and glue  Perineum: deferred  Extremities: symmetric and 1+ edema    Labs  Labs Reviewed:  No results found for: ABO, LABRH   Rubella: immune    Assessment/Plan   Problem-based Assessment and Plan    Joshua Garcia is a 35 y.o.  postop day 3 s/p , Low Transverse .    1. Vital Signs: stable  2. Hemodynamics: anemia without signs of hemodynamic instability, Fe supps  3. Pain: controlled  4. VTE Assessment: Early Ambulation  5. Vaccinations/Rhogam: rhogam not indicated   6. Post care: meeting all goals   7. Breastfeeding baby girl, no issues  8. H/O GDMA1- for 75g 2 hr GTT 12 weeks PP  9. Discharge to home today, POD3     Ekaterina Alvarado CNM

## 2021-08-09 NOTE — PROGRESS NOTES
Obstetrics Postpartum Progress Note    Events  No acute events overnight.    Subjective  Pain: no  Bleeding: lochia minimal  Diet: taking regular diet  Voiding: without difficulty  Bowel: passing flatus  Ambulating: as tolerated    Vitals  Temp:  [36.7 °C (98 °F)-37 °C (98.6 °F)] 36.7 °C (98 °F)  Heart Rate:  [69-75] 70  Resp:  [16-18] 18  BP: (102-125)/(49-60) 125/58    I&O  No intake or output data in the 24 hours ending 21 0709    Physical Exam  General: well  Heart: Regular rate and rhythm  Lungs: Clear to auscultation bilaterally  Abdomen: soft, nondistended, non-tender  Fundus: firm and below umbilicus  Incision: C/D/I sutures and glue  Perineum: deferred  Extremities: symmetric    Labs  Labs Reviewed:  Lab Results   Component Value Date    ABO O 2021    LABRH Positive 2021      Rubella: immune    Assessment/Plan   Problem-based Assessment and Plan    Joshua Garcia is a 35 y.o.  postop day 3 s/p , Low Transverse .    1. Vital Signs: stable  2. Hemodynamics: stable  3. Pain: controlled  4. VTE Assessment: Early Ambulation  5. Vaccinations/Rhogam: rhogam not indicated   6. Post care: meeting all goals   7. GDMA1: 2hr GTT at 6-12wks pp  8. Breastfeeding babygirl  9. Anticipate d/c home POD3     Batsheva Melvin MD

## 2021-08-09 NOTE — PLAN OF CARE
Problem: Adult Inpatient Plan of Care  Goal: Plan of Care Review  Outcome: Progressing    Discharge instructions signed and went over with patient. Patients questions answered and concerns addressed. Pt verbalized understanding.

## 2021-08-09 NOTE — NURSING NOTE
Discharge teaching discussed, pt verbalized understanding. Denies questions at this time. All personal belongings taken with pt. Ambulated off unit.

## 2021-08-09 NOTE — DISCHARGE SUMMARY
Inpatient Discharge Summary    Obstetrical Discharge Form    Primary OB Clinician: JERMAIN    EDC: Estimated Date of Delivery: 21    Gestational Age:39w2d    Antepartum complications: GDMA1, AMA, Obesity, Anemia    Date of Delivery: 2021 ; Time of Delivery: 1011    Delivered By: Ted Blair MD    Delivery Type: repeat  section, low transverse incision    Tubal Ligation: n/a    Baby: Liveborn female, Apgars 9/9, weight 7 #, 7.6 oz,     Anesthesia: spinal    Intrapartum complications: None    Laceration: n/a    Episiotomy: none,    Placenta: spontaneous    Feeding method: breast    Rh Immune globulin given: not applicable    Rubella vaccine given: not applicable    Discharge Date: 2021; Discharge Time: 1100    Early Discharge:  NO    Plan:    Address and phone number verified and same.  Follow-up appointment with JERMAIN in 2 weeks.

## 2021-08-10 LAB
ISBT CODE: 5100
ISBT CODE: 5100
PRODUCT CODE: NORMAL
PRODUCT CODE: NORMAL
PRODUCT STATUS: NORMAL
PRODUCT STATUS: NORMAL
SPECIMEN EXP DATE BLD: NORMAL
SPECIMEN EXP DATE BLD: NORMAL
UNIT ABO: NORMAL
UNIT ABO: NORMAL
UNIT ID: NORMAL
UNIT ID: NORMAL
UNIT RH: POSITIVE
UNIT RH: POSITIVE

## 2021-11-08 ENCOUNTER — APPOINTMENT (OUTPATIENT)
Dept: URGENT CARE | Facility: CLINIC | Age: 36
End: 2021-11-08

## 2021-11-08 ENCOUNTER — APPOINTMENT (OUTPATIENT)
Dept: LAB | Facility: CLINIC | Age: 36
End: 2021-11-08

## 2021-11-08 DIAGNOSIS — Z02.1 PRE-EMPLOYMENT HEALTH SCREENING EXAMINATION: Primary | ICD-10-CM

## 2021-11-08 LAB — RUBV IGG SERPL IA-ACNC: >175 IU/ML

## 2021-11-08 PROCEDURE — 86735 MUMPS ANTIBODY: CPT

## 2021-11-08 PROCEDURE — 86787 VARICELLA-ZOSTER ANTIBODY: CPT

## 2021-11-08 PROCEDURE — 36415 COLL VENOUS BLD VENIPUNCTURE: CPT

## 2021-11-08 PROCEDURE — 86480 TB TEST CELL IMMUN MEASURE: CPT

## 2021-11-08 PROCEDURE — 86765 RUBEOLA ANTIBODY: CPT

## 2021-11-08 PROCEDURE — 86762 RUBELLA ANTIBODY: CPT

## 2021-11-09 LAB
GAMMA INTERFERON BACKGROUND BLD IA-ACNC: 1.26 IU/ML
M TB IFN-G BLD-IMP: POSITIVE
M TB IFN-G CD4+ BCKGRND COR BLD-ACNC: >10 IU/ML
M TB IFN-G CD4+ BCKGRND COR BLD-ACNC: >10 IU/ML
MITOGEN IGNF BCKGRD COR BLD-ACNC: >10 IU/ML

## 2021-11-11 LAB
MUV IGG SER QL: NORMAL
VZV IGG SER IA-ACNC: NORMAL

## 2021-11-12 LAB — MEV IGG SER QL: NORMAL

## 2021-12-03 ENCOUNTER — APPOINTMENT (OUTPATIENT)
Dept: RADIOLOGY | Facility: CLINIC | Age: 36
End: 2021-12-03

## 2021-12-03 DIAGNOSIS — Z01.84 IMMUNITY STATUS TESTING: ICD-10-CM

## 2021-12-03 PROCEDURE — 71045 X-RAY EXAM CHEST 1 VIEW: CPT

## 2022-10-14 ENCOUNTER — HOSPITAL ENCOUNTER (EMERGENCY)
Facility: HOSPITAL | Age: 37
Discharge: HOME | End: 2022-10-14
Attending: EMERGENCY MEDICINE
Payer: COMMERCIAL

## 2022-10-14 ENCOUNTER — APPOINTMENT (EMERGENCY)
Dept: RADIOLOGY | Facility: HOSPITAL | Age: 37
End: 2022-10-14
Attending: EMERGENCY MEDICINE
Payer: COMMERCIAL

## 2022-10-14 VITALS
DIASTOLIC BLOOD PRESSURE: 72 MMHG | RESPIRATION RATE: 18 BRPM | WEIGHT: 202 LBS | TEMPERATURE: 98.2 F | HEIGHT: 64 IN | HEART RATE: 80 BPM | OXYGEN SATURATION: 100 % | BODY MASS INDEX: 34.49 KG/M2 | SYSTOLIC BLOOD PRESSURE: 122 MMHG

## 2022-10-14 DIAGNOSIS — R10.13 EPIGASTRIC PAIN: Primary | ICD-10-CM

## 2022-10-14 LAB
ALBUMIN SERPL-MCNC: 3.9 G/DL (ref 3.4–5)
ALP SERPL-CCNC: 62 IU/L (ref 35–126)
ALT SERPL-CCNC: 9 IU/L (ref 11–54)
ANION GAP SERPL CALC-SCNC: 6 MEQ/L (ref 3–15)
AST SERPL-CCNC: 14 IU/L (ref 15–41)
B-HCG UR QL: NEGATIVE
BACTERIA URNS QL MICRO: ABNORMAL /HPF
BASOPHILS # BLD: 0.02 K/UL (ref 0.01–0.1)
BASOPHILS NFR BLD: 0.4 %
BILIRUB SERPL-MCNC: 0.8 MG/DL (ref 0.3–1.2)
BILIRUB UR QL STRIP.AUTO: NEGATIVE MG/DL
BUN SERPL-MCNC: 12 MG/DL (ref 8–20)
CALCIUM SERPL-MCNC: 8.9 MG/DL (ref 8.9–10.3)
CHLORIDE SERPL-SCNC: 106 MEQ/L (ref 98–109)
CLARITY UR REFRACT.AUTO: CLEAR
CO2 SERPL-SCNC: 26 MEQ/L (ref 22–32)
COLOR UR AUTO: YELLOW
CREAT SERPL-MCNC: 0.7 MG/DL (ref 0.6–1.1)
DIFFERENTIAL METHOD BLD: ABNORMAL
EOSINOPHIL # BLD: 0.09 K/UL (ref 0.04–0.36)
EOSINOPHIL NFR BLD: 1.8 %
ERYTHROCYTE [DISTWIDTH] IN BLOOD BY AUTOMATED COUNT: 13.9 % (ref 11.7–14.4)
GFR SERPL CREATININE-BSD FRML MDRD: >60 ML/MIN/1.73M*2
GLUCOSE SERPL-MCNC: 114 MG/DL (ref 70–99)
GLUCOSE UR STRIP.AUTO-MCNC: NEGATIVE MG/DL
HCT VFR BLDCO AUTO: 34.6 % (ref 35–45)
HGB BLD-MCNC: 11.4 G/DL (ref 11.8–15.7)
HGB UR QL STRIP.AUTO: NEGATIVE
HYALINE CASTS #/AREA URNS LPF: ABNORMAL /LPF
IMM GRANULOCYTES # BLD AUTO: 0.01 K/UL (ref 0–0.08)
IMM GRANULOCYTES NFR BLD AUTO: 0.2 %
KETONES UR STRIP.AUTO-MCNC: NEGATIVE MG/DL
LEUKOCYTE ESTERASE UR QL STRIP.AUTO: NEGATIVE
LIPASE SERPL-CCNC: 23 U/L (ref 20–51)
LYMPHOCYTES # BLD: 1.78 K/UL (ref 1.2–3.5)
LYMPHOCYTES NFR BLD: 36.1 %
MCH RBC QN AUTO: 27.9 PG (ref 28–33.2)
MCHC RBC AUTO-ENTMCNC: 32.9 G/DL (ref 32.2–35.5)
MCV RBC AUTO: 84.8 FL (ref 83–98)
MONOCYTES # BLD: 0.36 K/UL (ref 0.28–0.8)
MONOCYTES NFR BLD: 7.3 %
MUCOUS THREADS URNS QL MICRO: 3 /LPF
NEUTROPHILS # BLD: 2.67 K/UL (ref 1.7–7)
NEUTS SEG NFR BLD: 54.2 %
NITRITE UR QL STRIP.AUTO: NEGATIVE
NRBC BLD-RTO: 0 %
PDW BLD AUTO: 10.6 FL (ref 9.4–12.3)
PH UR STRIP.AUTO: 6.5 [PH]
PLATELET # BLD AUTO: 233 K/UL (ref 150–369)
POCT TEST: NORMAL
POTASSIUM SERPL-SCNC: 3.7 MEQ/L (ref 3.6–5.1)
PROT SERPL-MCNC: 7.2 G/DL (ref 6–8.2)
PROT UR QL STRIP.AUTO: 1
RBC # BLD AUTO: 4.08 M/UL (ref 3.93–5.22)
RBC #/AREA URNS HPF: ABNORMAL /HPF
SODIUM SERPL-SCNC: 138 MEQ/L (ref 136–144)
SP GR UR REFRACT.AUTO: 1.03
SQUAMOUS URNS QL MICRO: 1 /HPF
UROBILINOGEN UR STRIP-ACNC: 0.2 EU/DL
WBC # BLD AUTO: 4.93 K/UL (ref 3.8–10.5)
WBC #/AREA URNS HPF: ABNORMAL /HPF

## 2022-10-14 PROCEDURE — 85025 COMPLETE CBC W/AUTO DIFF WBC: CPT | Performed by: EMERGENCY MEDICINE

## 2022-10-14 PROCEDURE — 76705 ECHO EXAM OF ABDOMEN: CPT

## 2022-10-14 PROCEDURE — 81003 URINALYSIS AUTO W/O SCOPE: CPT | Performed by: EMERGENCY MEDICINE

## 2022-10-14 PROCEDURE — 74177 CT ABD & PELVIS W/CONTRAST: CPT | Mod: MG

## 2022-10-14 PROCEDURE — 80053 COMPREHEN METABOLIC PANEL: CPT | Performed by: EMERGENCY MEDICINE

## 2022-10-14 PROCEDURE — 63600105 HC IODINE BASED CONTRAST: Performed by: PHYSICIAN ASSISTANT

## 2022-10-14 PROCEDURE — 36415 COLL VENOUS BLD VENIPUNCTURE: CPT

## 2022-10-14 PROCEDURE — 83690 ASSAY OF LIPASE: CPT | Performed by: EMERGENCY MEDICINE

## 2022-10-14 PROCEDURE — 99284 EMERGENCY DEPT VISIT MOD MDM: CPT | Mod: 25

## 2022-10-14 RX ORDER — OMEPRAZOLE 20 MG/1
20 CAPSULE, DELAYED RELEASE ORAL
Qty: 30 CAPSULE | Refills: 0 | Status: SHIPPED | OUTPATIENT
Start: 2022-10-14 | End: 2022-11-13

## 2022-10-14 RX ADMIN — IOHEXOL 100 ML: 350 INJECTION, SOLUTION INTRAVENOUS at 11:58

## 2022-10-14 ASSESSMENT — ENCOUNTER SYMPTOMS
HEMATURIA: 0
DIARRHEA: 0
SHORTNESS OF BREATH: 0
BACK PAIN: 0
ABDOMINAL PAIN: 1
ARTHRALGIAS: 0
FEVER: 0
COLOR CHANGE: 0
VOMITING: 0
CHILLS: 0
NUMBNESS: 0
FLANK PAIN: 0
COUGH: 0
PALPITATIONS: 0
NAUSEA: 0
DYSURIA: 0
CONSTIPATION: 0

## 2022-10-14 NOTE — ED PROVIDER NOTES
Emergency Medicine Note  HPI   HISTORY OF PRESENT ILLNESS     37 y/o female with no significant PMHx presents with RUQ pain x 2 weeks. States pain was intermittent but since yesterday has been more constant. Pain is not associated with eating and states she has been able to eat well. Denies nausea, vomiting, diarrhea, dysuria, hematuria, back pain, chest pain, SOB or fevers. No prior abdominal surgeries.            Patient History   PAST HISTORY     Reviewed from Nursing Triage:       Past Medical History:   Diagnosis Date    Anemia     GDM (gestational diabetes mellitus)     GERD (gastroesophageal reflux disease)     during pregnancy    Wears glasses        Past Surgical History:   Procedure Laterality Date    CERVICAL BIOPSY  W/ LOOP ELECTRODE EXCISION  2013     SECTION  ; 2018    DILATION AND EVACUATION  2013       Family History   Problem Relation Age of Onset    No Known Problems Biological Mother     Diabetes Biological Father     Hyperlipidemia Biological Father        Social History     Tobacco Use    Smoking status: Never    Smokeless tobacco: Never   Vaping Use    Vaping Use: Never used   Substance Use Topics    Alcohol use: No    Drug use: No         Review of Systems   REVIEW OF SYSTEMS     Review of Systems   Constitutional: Negative for chills and fever.   Respiratory: Negative for cough and shortness of breath.    Cardiovascular: Negative for chest pain and palpitations.   Gastrointestinal: Positive for abdominal pain. Negative for constipation, diarrhea, nausea and vomiting.   Genitourinary: Negative for dysuria, flank pain and hematuria.   Musculoskeletal: Negative for arthralgias and back pain.   Skin: Negative for color change and rash.   Neurological: Negative for syncope and numbness.   All other systems reviewed and are negative.        VITALS     ED Vitals    Date/Time Temp Pulse Resp BP SpO2 [facility]   10/14/22 1237 36.8 °C (98.2 °F) 80 18 122/72 100 % SFC   10/14/22  0913 36.8 °C (98.2 °F) 73 18 127/70 100 % SFC        Pulse Ox %: 100 % (10/14/22 0958)  Pulse Ox Interpretation: Normal (10/14/22 0958)           Physical Exam   PHYSICAL EXAM     Physical Exam  Vitals and nursing note reviewed.   Constitutional:       General: She is not in acute distress.     Appearance: She is well-developed.   HENT:      Head: Normocephalic and atraumatic.   Eyes:      Conjunctiva/sclera: Conjunctivae normal.   Cardiovascular:      Rate and Rhythm: Normal rate and regular rhythm.   Pulmonary:      Effort: Pulmonary effort is normal. No respiratory distress.      Breath sounds: Normal breath sounds.   Abdominal:      Palpations: Abdomen is soft.      Tenderness: There is abdominal tenderness in the right upper quadrant. There is no right CVA tenderness, left CVA tenderness or guarding. Negative signs include McBurney's sign.   Musculoskeletal:      Cervical back: Neck supple.   Skin:     General: Skin is warm and dry.   Neurological:      Mental Status: She is alert.           PROCEDURES     Procedures     DATA     Results     Procedure Component Value Units Date/Time    Lipase, if pain is above umbilicus [[id number]]  (Normal) Collected: 10/14/22 0936    Specimen: Blood, Venous Updated: 10/14/22 1011     Lipase 23 U/L     Narrative:      Order only if pain is above umbilicus    Comprehensive metabolic panel [[id number]]  (Abnormal) Collected: 10/14/22 0936    Specimen: Blood, Venous Updated: 10/14/22 1011     Sodium 138 mEQ/L      Potassium 3.7 mEQ/L      Comment: Results obtained on plasma. Plasma Potassium values may be up to 0.4 mEQ/L less than serum values. The differences may be greater for patients with high platelet or white cell counts.        Chloride 106 mEQ/L      CO2 26 mEQ/L      BUN 12 mg/dL      Creatinine 0.7 mg/dL      Glucose 114 mg/dL      Calcium 8.9 mg/dL      AST (SGOT) 14 IU/L      ALT (SGPT) 9 IU/L      Alkaline Phosphatase 62 IU/L      Total Protein 7.2 g/dL      Comment:  Test performed on plasma which typically contains approximately 0.4 g/dL more protein than serum.        Albumin 3.9 g/dL      Bilirubin, Total 0.8 mg/dL      eGFR >60.0 mL/min/1.73m*2      Anion Gap 6 mEQ/L     Narrative:      Order only if pain is above umbilicus    Urinalysis with Reflex Culture [[id number]]  (Abnormal) Collected: 10/14/22 0937    Specimen: Urine, Clean Catch Updated: 10/14/22 1011    Narrative:      The following orders were created for panel order Urinalysis with Reflex Culture.  Procedure                               Abnormality         Status                     ---------                               -----------         ------                     UA Reflex to Culture (Ma...[[id number]]  Abnormal            Final result               UA Microscopic[[id number]]               Abnormal            Final result                 Please view results for these tests on the individual orders.    UA Microscopic [[id number]]  (Abnormal) Collected: 10/14/22 0937    Specimen: Urine, Clean Catch Updated: 10/14/22 1011     RBC, Urine 5 TO 9 /HPF      WBC, Urine 0 TO 3 /HPF      Squamous Epithelial +1 /hpf      Hyaline Cast None Seen /lpf      Bacteria, Urine None Seen /HPF      MUCUSUA +3 /LPF     CBC and differential [[id number]]  (Abnormal) Collected: 10/14/22 0936    Specimen: Blood, Venous Updated: 10/14/22 0957     WBC 4.93 K/uL      RBC 4.08 M/uL      Hemoglobin 11.4 g/dL      Hematocrit 34.6 %      MCV 84.8 fL      MCH 27.9 pg      MCHC 32.9 g/dL      RDW 13.9 %      Platelets 233 K/uL      MPV 10.6 fL      Differential Type Auto     nRBC 0.0 %      Immature Granulocytes 0.2 %      Neutrophils 54.2 %      Lymphocytes 36.1 %      Monocytes 7.3 %      Eosinophils 1.8 %      Basophils 0.4 %      Immature Granulocytes, Absolute 0.01 K/uL      Neutrophils, Absolute 2.67 K/uL      Lymphocytes, Absolute 1.78 K/uL      Monocytes, Absolute 0.36 K/uL      Eosinophils, Absolute 0.09 K/uL      Basophils, Absolute  0.02 K/uL     UA Reflex to Culture (Macroscopic) [[id number]]  (Abnormal) Collected: 10/14/22 0937    Specimen: Urine, Clean Catch Updated: 10/14/22 0952     Color, Urine Yellow     Clarity, Urine Clear     Specific Gravity, Urine 1.035     pH, Urine 6.5     Leukocyte Esterase Negative     Comment: Results can be falsely negative due to high specific gravity, some antibiotics, glucose >3 g/dl, or WBC other than neutrophils.        Nitrite, Urine Negative     Protein, Urine +1     Glucose, Urine Negative mg/dL      Ketones, Urine Negative mg/dL      Urobilinogen, Urine 0.2 EU/dL      Bilirubin, Urine Negative mg/dL      Blood, Urine Negative     Comment: The sensitivity of the occult blood test is equivalent to approximately 4 intact RBC/HPF.             Imaging Results          CT ABDOMEN PELVIS WITH IV CONTRAST (Final result)  Result time 10/14/22 12:20:49    Final result                 Impression:    IMPRESSION:    Suggestion for thickening and haziness of fat around the gastroduodenal  junction. Findings may be sequelae of gastritis although underlying ulcer cannot  be excluded. Correlation can be made with endoscopy.    Apparent urinary bladder wall thickening. Please see comment.             Narrative:    CLINICAL HISTORY: RUQ Abdominal Pain    COMPARISON:   Right upper quadrant ultrasound from 10/14/2022    COMMENT:  Technique: CT of the Abdomen and Pelvis was performed with IV contrast: 100 cc  of Omnipaque 350 was administered without adverse reaction. CT DOSE:  One or  more dose reduction techniques (e.g. automated exposure control, adjustment of  the mA and/or kV according to patient size, use of iterative reconstruction  technique) utilized for this examination.    LOWER CHEST: Within normal limits.    ABDOMEN:    Liver: There are 2 small hypodensities in the left lateral segment, too small to  characterize but likely a cyst.  Bile ducts: Within normal limits.  Gallbladder: Within normal  limits.  Pancreas: Within normal limits.  Spleen: Within normal limits.  Adrenals: Within normal limits.  Kidneys: Within normal limits.    PELVIS:  Reproductive organs: There are bilateral Bartholin's gland cysts measuring 2.7  cm and 2.3 cm.  Ureters: Within normal limits.  Bladder: There is suggestion for circumferential wall thickening and mild  haziness of the surrounding fat. Although some of this may be due to incomplete  distention, superimposed cystitis cannot be excluded. Correlation can be made  with urinalysis.    Bowel: There is suggestion for thickening around the gastroduodenal junction  with suggestion for mild haziness of the fat medially on axial images 37-43.  There is no bowel obstruction. The appendix is unremarkable.  Peritoneum: No ascites, free air, or fluid collection.  Vessels: Within normal limits.  Retroperitoneum and lymph nodes: Within normal limits.  Abdominal wall: There is a small umbilical hernia containing fat. There are  small bilateral inguinal hernias containing fat.  Bones: Degenerative changes in the spine.                               ULTRASOUND GALLBLADDER (Final result)  Result time 10/14/22 10:35:00    Final result                 Impression:    IMPRESSION:  Essentially normal.               Narrative:    CLINICAL HISTORY: Pain    PRIOR STUDY: None    TECHNIQUE:  Right upper quadrant ultrasound    CT DOSE:  One or more dose reduction techniques (e.g. automated exposure  control, adjustment of the mA and/or kV according to patient size, use of  iterative reconstruction technique) utilized for this examination.    COMMENT: The liver measures 16 cm and shows no mass or biliary ductal  dilatation. There is a 5 mm left lobe cyst. The right kidney measures 11.7 cm  and shows no calculus, mass or hydronephrosis. The common bile duct measures 2  mm. The gallbladder is normal without cholelithiasis or thickening. The  pancreas, aorta and IVC appear unremarkable.                                 No orders to display       Scoring tools                                  ED Course & MDM   MDM / ED COURSE / CLINICAL IMPRESSION / DISPO     MDM  Number of Diagnoses or Management Options  Diagnosis management comments: A/P: Abdominal pain, likely gastritis. Labs unremarkable and u/s neg for stones/cholecystitis. Will start on prilosec. Discussed need to f/u with GI for endoscopy.       Amount and/or Complexity of Data Reviewed  Clinical lab tests: reviewed  Tests in the radiology section of CPT®: reviewed        ED Course as of 10/14/22 1259   Fri Oct 14, 2022   1250 Patient given results of CT. Appears well and labs unremarkable. Patient understands need to f/u with GI for endoscopy. [CP]      ED Course User Index  [CP] Grecia Esquivel PA C     Clinical Impression      None              Grecia Esquivel PA C  10/14/22 1250

## 2022-10-14 NOTE — ED ATTESTATION NOTE
The patient was evaluated and managed by the physician assistant / nurse practitioner.     Vita Nuno, DO  10/14/22 1740

## 2023-09-06 ENCOUNTER — TRANSCRIBE ORDERS (OUTPATIENT)
Dept: SCHEDULING | Age: 38
End: 2023-09-06

## 2023-09-06 DIAGNOSIS — E04.9 NONTOXIC GOITER, UNSPECIFIED: Primary | ICD-10-CM

## 2023-09-12 ENCOUNTER — HOSPITAL ENCOUNTER (OUTPATIENT)
Dept: RADIOLOGY | Facility: HOSPITAL | Age: 38
Discharge: HOME | End: 2023-09-12
Attending: PHYSICIAN ASSISTANT
Payer: COMMERCIAL

## 2023-09-12 DIAGNOSIS — E04.9 NONTOXIC GOITER, UNSPECIFIED: ICD-10-CM

## 2023-09-12 PROCEDURE — 76536 US EXAM OF HEAD AND NECK: CPT

## 2024-11-27 ENCOUNTER — TRANSCRIBE ORDERS (OUTPATIENT)
Dept: RADIOLOGY | Age: 39
End: 2024-11-27

## 2024-11-27 ENCOUNTER — HOSPITAL ENCOUNTER (OUTPATIENT)
Dept: RADIOLOGY | Age: 39
Discharge: HOME | End: 2024-11-27
Attending: OBSTETRICS & GYNECOLOGY
Payer: COMMERCIAL

## 2024-11-27 DIAGNOSIS — Z12.31 ENCOUNTER FOR SCREENING MAMMOGRAM FOR MALIGNANT NEOPLASM OF BREAST: Primary | ICD-10-CM

## 2024-11-27 DIAGNOSIS — Z12.31 ENCOUNTER FOR SCREENING MAMMOGRAM FOR MALIGNANT NEOPLASM OF BREAST: ICD-10-CM

## 2024-11-27 PROCEDURE — 77063 BREAST TOMOSYNTHESIS BI: CPT

## 2025-03-24 ENCOUNTER — TRANSCRIBE ORDERS (OUTPATIENT)
Dept: SCHEDULING | Age: 40
End: 2025-03-24

## 2025-03-24 DIAGNOSIS — Z30.431 ENCOUNTER FOR ROUTINE CHECKING OF INTRAUTERINE CONTRACEPTIVE DEVICE: Primary | ICD-10-CM

## 2025-04-01 ENCOUNTER — HOSPITAL ENCOUNTER (OUTPATIENT)
Dept: RADIOLOGY | Facility: HOSPITAL | Age: 40
Discharge: HOME | End: 2025-04-01
Attending: STUDENT IN AN ORGANIZED HEALTH CARE EDUCATION/TRAINING PROGRAM
Payer: COMMERCIAL

## 2025-04-01 DIAGNOSIS — Z30.431 ENCOUNTER FOR ROUTINE CHECKING OF INTRAUTERINE CONTRACEPTIVE DEVICE: ICD-10-CM

## 2025-04-01 PROCEDURE — 76856 US EXAM PELVIC COMPLETE: CPT

## 2025-05-02 ENCOUNTER — TRANSCRIBE ORDERS (OUTPATIENT)
Dept: SCHEDULING | Age: 40
End: 2025-05-02

## 2025-05-02 DIAGNOSIS — N64.4 MASTODYNIA: Primary | ICD-10-CM

## 2025-05-08 ENCOUNTER — HOSPITAL ENCOUNTER (OUTPATIENT)
Dept: RADIOLOGY | Facility: HOSPITAL | Age: 40
Discharge: HOME | End: 2025-05-08
Payer: COMMERCIAL

## 2025-05-08 ENCOUNTER — PATIENT OUTREACH (OUTPATIENT)
Dept: RADIOLOGY | Facility: HOSPITAL | Age: 40
End: 2025-05-08
Payer: COMMERCIAL

## 2025-05-08 DIAGNOSIS — N64.4 MASTODYNIA: ICD-10-CM

## 2025-05-08 PROCEDURE — 77065 DX MAMMO INCL CAD UNI: CPT | Mod: LT

## 2025-05-08 PROCEDURE — 76642 ULTRASOUND BREAST LIMITED: CPT | Mod: LT

## 2025-05-13 ENCOUNTER — OFFICE VISIT (OUTPATIENT)
Dept: SURGERY | Facility: CLINIC | Age: 40
End: 2025-05-13
Payer: COMMERCIAL

## 2025-05-13 VITALS
OXYGEN SATURATION: 99 % | HEIGHT: 65 IN | HEART RATE: 67 BPM | SYSTOLIC BLOOD PRESSURE: 114 MMHG | BODY MASS INDEX: 36.65 KG/M2 | DIASTOLIC BLOOD PRESSURE: 80 MMHG | WEIGHT: 220 LBS

## 2025-05-13 DIAGNOSIS — R92.8 ABNORMAL MAMMOGRAM: ICD-10-CM

## 2025-05-13 DIAGNOSIS — N64.9 ABNORMAL NIPPLE: Primary | ICD-10-CM

## 2025-05-13 PROCEDURE — 3008F BODY MASS INDEX DOCD: CPT | Performed by: STUDENT IN AN ORGANIZED HEALTH CARE EDUCATION/TRAINING PROGRAM

## 2025-05-13 PROCEDURE — 99203 OFFICE O/P NEW LOW 30 MIN: CPT | Performed by: STUDENT IN AN ORGANIZED HEALTH CARE EDUCATION/TRAINING PROGRAM

## 2025-05-20 ENCOUNTER — HOSPITAL ENCOUNTER (OUTPATIENT)
Dept: RADIOLOGY | Facility: HOSPITAL | Age: 40
Discharge: HOME | End: 2025-05-20
Attending: STUDENT IN AN ORGANIZED HEALTH CARE EDUCATION/TRAINING PROGRAM
Payer: COMMERCIAL

## 2025-05-20 DIAGNOSIS — N64.9 ABNORMAL NIPPLE: ICD-10-CM

## 2025-05-20 PROCEDURE — A9573: HCPCS | Mod: JZ | Performed by: STUDENT IN AN ORGANIZED HEALTH CARE EDUCATION/TRAINING PROGRAM

## 2025-05-20 PROCEDURE — C8908 MRI W/O FOL W/CONT, BREAST,: HCPCS

## 2025-05-20 PROCEDURE — A9579 GAD-BASE MR CONTRAST NOS,1ML: HCPCS | Mod: JZ | Performed by: STUDENT IN AN ORGANIZED HEALTH CARE EDUCATION/TRAINING PROGRAM

## 2025-05-20 RX ADMIN — GADOPICLENOL 10 ML: 485.1 INJECTION INTRAVENOUS at 17:42

## 2025-05-21 RX ORDER — NYSTATIN AND TRIAMCINOLONE ACETONIDE 100000; 1 [USP'U]/G; MG/G
OINTMENT TOPICAL 2 TIMES DAILY
Qty: 15 G | Refills: 0 | Status: SHIPPED | OUTPATIENT
Start: 2025-05-21

## (undated) DEVICE — BLADE SCALPEL #10

## (undated) DEVICE — ***USE 56891*** SUTURE CHROMIC GUT  0   812H

## (undated) DEVICE — PACK C-SECTION RMH

## (undated) DEVICE — SUTURE MONOCRYL 4-0 PS-1 Y682H

## (undated) DEVICE — ***USE 138760*** SUTURE VICRYL 0 J370H CTX 36IN VIOLET

## (undated) DEVICE — Device

## (undated) DEVICE — SYRINGE BULB 60CC

## (undated) DEVICE — PENCIL ESU HANDSWITCHING W/HOL

## (undated) DEVICE — DRESSING TELFA 3X4

## (undated) DEVICE — ***USE 138537*** SUTURE VICRYL 0 J340H CT-1 27IN VIOLET

## (undated) DEVICE — ***USE 56937*** SUTURE PLAIN GUT 2-0   843H

## (undated) DEVICE — APPLICATOR CHLORAPREP 26ML ORANGE TINT

## (undated) DEVICE — PAD GROUND ELECTROSURGICAL W/CORD

## (undated) DEVICE — COVERS LIGHT HANDLE DISPOSABLE

## (undated) DEVICE — CONTAINER SPECIMEN STERILE 5OZ

## (undated) DEVICE — SPONGE LAP 18X18 SAFE-T RFID ENHANCED XRAY

## (undated) DEVICE — BETADINE SOLUTION 8OZ BT

## (undated) DEVICE — PAD PERI MATERNITY LENGTH

## (undated) DEVICE — PACK TOWEL OR

## (undated) DEVICE — DRESSING TEGADERM 4X4 3/4

## (undated) DEVICE — ADHESIVE SKIN DERMABOND ADVANCED 0.7ML

## (undated) DEVICE — DRESSING TELFA 3X8

## (undated) DEVICE — SPONGE LAP DISPOSABLE 18X18

## (undated) DEVICE — PENEVAC1 NONSTICK SMOKE EVAC

## (undated) DEVICE — ADHESIVE SKIN LIQUIBAND EXCEED .8GM

## (undated) DEVICE — DRESSING ABD STERILE 7X8IN

## (undated) DEVICE — DRESSING ABD STER LGE 8X10